# Patient Record
Sex: FEMALE | Race: WHITE | Employment: FULL TIME | URBAN - METROPOLITAN AREA
[De-identification: names, ages, dates, MRNs, and addresses within clinical notes are randomized per-mention and may not be internally consistent; named-entity substitution may affect disease eponyms.]

---

## 2017-01-15 ENCOUNTER — HOSPITAL ENCOUNTER (EMERGENCY)
Facility: CLINIC | Age: 32
Discharge: HOME | End: 2017-01-15
Attending: EMERGENCY MEDICINE | Admitting: EMERGENCY MEDICINE
Payer: COMMERCIAL

## 2017-01-15 ENCOUNTER — TELEPHONE (OUTPATIENT)
Dept: NURSING | Facility: CLINIC | Age: 32
End: 2017-01-15

## 2017-01-15 VITALS
HEART RATE: 78 BPM | SYSTOLIC BLOOD PRESSURE: 122 MMHG | BODY MASS INDEX: 25.39 KG/M2 | RESPIRATION RATE: 16 BRPM | WEIGHT: 155 LBS | TEMPERATURE: 99 F | OXYGEN SATURATION: 99 % | DIASTOLIC BLOOD PRESSURE: 87 MMHG

## 2017-01-15 DIAGNOSIS — N93.9 VAGINAL BLEEDING: ICD-10-CM

## 2017-01-15 LAB
ALBUMIN UR-MCNC: NEGATIVE MG/DL
APPEARANCE UR: CLEAR
BILIRUB UR QL STRIP: NEGATIVE
COLOR UR AUTO: YELLOW
GLUCOSE UR STRIP-MCNC: NEGATIVE MG/DL
HCG UR QL: NEGATIVE
HGB UR QL STRIP: ABNORMAL
KETONES UR STRIP-MCNC: NEGATIVE MG/DL
LEUKOCYTE ESTERASE UR QL STRIP: NEGATIVE
MUCOUS THREADS #/AREA URNS LPF: PRESENT /LPF
NITRATE UR QL: NEGATIVE
PH UR STRIP: 6.5 PH (ref 5–7)
RBC #/AREA URNS AUTO: 6 /HPF (ref 0–2)
SP GR UR STRIP: 1.01 (ref 1–1.03)
SQUAMOUS #/AREA URNS AUTO: <1 /HPF (ref 0–1)
URN SPEC COLLECT METH UR: ABNORMAL
UROBILINOGEN UR STRIP-MCNC: NORMAL MG/DL (ref 0–2)
WBC #/AREA URNS AUTO: <1 /HPF (ref 0–2)

## 2017-01-15 PROCEDURE — 81025 URINE PREGNANCY TEST: CPT | Performed by: EMERGENCY MEDICINE

## 2017-01-15 PROCEDURE — 99283 EMERGENCY DEPT VISIT LOW MDM: CPT

## 2017-01-15 PROCEDURE — 99284 EMERGENCY DEPT VISIT MOD MDM: CPT | Performed by: EMERGENCY MEDICINE

## 2017-01-15 PROCEDURE — 81001 URINALYSIS AUTO W/SCOPE: CPT | Performed by: EMERGENCY MEDICINE

## 2017-01-15 ASSESSMENT — ENCOUNTER SYMPTOMS
DIARRHEA: 0
ABDOMINAL PAIN: 1
VOMITING: 0

## 2017-01-15 NOTE — ED AVS SNAPSHOT
[facility] Emergency Department    [address] [facility] [address]    Phone:  [phone]    Fax:  [phone]                                       Roselyn Butler   MRN: [id number]    Department:  [facility] Emergency Department   Date of Visit:  1/15/2017           Patient Information     Date Of Birth          1985        Your diagnoses for this visit were:     Vaginal bleeding possible miscarriage       You were seen by Westley Chan MD.      Follow-up Information     Follow up with Agbeh, Cephas Mawuena, MD.    Specialty:  OB/Gyn    Why:  As needed    Contact information:    [facility]  [address]  [place] [address] [phone]          Follow up with [facility].    Specialty:  EMERGENCY MEDICINE    Why:  If symptoms worsen    Contact information:    [address] [place] [id number] [phone]    Additional information:    The medical center is located at   [address] [place] [address] (between [facility] and   [facility] in [place], [place] miles north   of [place]).        Discharge Instructions         Return is symptoms worsen or new symptoms develop.  Follow up with primary care physician next available.  Follow-up with your OB/Gyn next available.  Take ibuprofen or Tylenol for pain.  If increased bleeding, abdominal pain or other symptoms present please return for further evaluation and care.  Urine pregnancy test today was negative.  He more than likely has had a miscarriage or-year-old pregnancy tests were false positives.    Future Appointments        Provider Department Dept Phone Center    2/16/2017 1:00 PM Nusrat Wilson MD [facility] [phone] [place]      24 Hour Appointment Hotline       To make an appointment at any [facility], call [phone]). If you don't have a family doctor or clinic, we will help you find one. [facility] are conveniently  located to serve the needs of you and your family.             Review of your medicines      Our records show that you are taking the medicines listed below. If these are incorrect, please call your family doctor or clinic.        Dose / Directions Last dose taken    adapalene 0.1 % cream   Commonly known as:  DIFFERIN   Quantity:  45 g        Apply a pea sized amount to the face nightly.   Refills:  11        diclofenac 75 MG EC tablet   Commonly known as:  VOLTAREN   Dose:  75 mg   Quantity:  30 tablet        Take 1 tablet (75 mg) by mouth 2 times daily as needed for moderate pain   Refills:  1        methocarbamol 500 MG tablet   Commonly known as:  ROBAXIN   Dose:  500 mg   Quantity:  30 tablet        Take 1 tablet (500 mg) by mouth 3 times daily as needed for muscle spasms   Refills:  1                Procedures and tests performed during your visit     HCG qualitative urine    UA reflex to Microscopic      Orders Needing Specimen Collection     None      Pending Results     No orders found from 1/14/2017 to 1/16/2017.            Pending Culture Results     No orders found from 1/14/2017 to 1/16/2017.       Test Results from your hospital stay           1/15/2017  3:32 PM - Interface, Mobile Ads Results      Component Results     Component Value Ref Range & Units Status    Color Urine Yellow  Final    Appearance Urine Clear  Final    Glucose Urine Negative NEG mg/dL Final    Bilirubin Urine Negative NEG Final    Ketones Urine Negative NEG mg/dL Final    Specific Gravity Urine 1.015 1.003 - 1.035 Final    Blood Urine Trace (A) NEG Final    pH Urine 6.5 5.0 - 7.0 pH Final    Protein Albumin Urine Negative NEG mg/dL Final    Urobilinogen mg/dL Normal 0.0 - 2.0 mg/dL Final    Nitrite Urine Negative NEG Final    Leukocyte Esterase Urine Negative NEG Final    Source Midstream Urine  Final    RBC Urine 6 (H) 0 - 2 /HPF Final    WBC Urine <1 0 - 2 /HPF Final    Squamous Epithelial /HPF Urine <1 0 - 1 /HPF Final    Mucous  Urine Present (A) NEG /LPF Final         1/15/2017  3:31 PM - Interface, Flexilab Results      Component Results     Component Value Ref Range & Units Status    HCG Qual Urine Negative NEG Final                Thank you for choosing [facility]       Thank you for choosing [facility] for your care. Our goal is always to provide you with excellent care. Hearing back from our patients is one way we can continue to improve our services. Please take a few minutes to complete the written survey that you may receive in the mail after you visit with us. Thank you!        [employer] Information     [employer] gives you secure access to your electronic health record. If you see a primary care provider, you can also send messages to your care team and make appointments. If you have questions, please call your primary care clinic.  If you do not have a primary care provider, please call [phone] and they will assist you.        Care EveryWhere ID     This is your Care EveryWhere ID. This could be used by other organizations to access your [facility] medical records  [id number]        After Visit Summary       This is your record. Keep this with you and show to your community pharmacist(s) and doctor(s) at your next visit.

## 2017-01-15 NOTE — TELEPHONE ENCOUNTER
"Call Type: Triage Call    Presenting Problem: 'I took a home pregnancy test last week end and  it was positive, but all week end I have had bleeding like a normal  period(soaking through a pad about every two hrs.) some mild  cramping. I think I am about 4 weeks along.\" Denies other sx.  tTiaged and advised ER.  Triage Note:  Guideline Title: Pregnancy: Spontaneous Termination, Less Than 20 Weeks  Recommended Disposition: See ED Immediately  Original Inclination: Wanted to speak with a nurse  Override Disposition:  Intended Action: Follow advice given  Physician Contacted: No  Moderate vaginal bleeding ?  YES  Vaginal bleeding AND greater than 20 weeks gestation ? NO  Experiencing contractions AND 20-37 weeks gestation ? NO  Abdominal pain AND greater than 20 weeks gestation ? NO  New or worsening signs and symptoms that may indicate shock ? NO  More than 37 weeks gestation AND contractions ? NO  Recent positive pregnancy test or menses late AND new onset of pain on top or back  of shoulders ? NO  Unbearable abdominal, pelvic or back pain ? NO  Heavy vaginal bleeding (soaking 1 pad every hour for 2 hours or more) ? NO  Continuous bright red vaginal bleeding for more than 15 minutes (more than  spotting) ? NO  Persistent dizziness OR lightheadedness that does not resolve within ten minutes ?  NO  Physician Instructions:  Care Advice: Another adult should drive.  CALL  if either of these occur: increased bright red vaginal  bleeding or continuous (without relaxation) abdominal pain.  IMMEDIATE ACTION  "

## 2017-01-15 NOTE — ED NOTES
Pt stated bleeding started Friday 1/13/17- similar to period-type bleeding. Mild cramping. Denies nausea.

## 2017-01-15 NOTE — ED PROVIDER NOTES
History     Chief Complaint   Patient presents with     Vaginal Bleeding      LMP Dec 13th with + HPT 7 days ago now with vaginal bleeding since Friday.      HPI  Roselyn Butler is a 31 year old female who has a history of miscarriage and vaginal delivery who presents to the ED for evaluation of vaginal bleeding. Patient just found out that she was pregnant one week ago, and then started to experience vaginal bleeding similar to a menstrual cycle 3 days ago. She does have some abdominal pain that is similar to cramping. Patient has had one miscarriage at 10 weeks in between her two daughters who were vaginally delivered. She denies nausea or vomiting. She denies any significant pain at this time.      Patient Active Problem List   Diagnosis     Encounter for supervision of other normal pregnancy     Need for Tdap vaccination     Current Outpatient Prescriptions   Medication Sig Dispense Refill     methocarbamol (ROBAXIN) 500 MG tablet Take 1 tablet (500 mg) by mouth 3 times daily as needed for muscle spasms 30 tablet 1     diclofenac (VOLTAREN) 75 MG EC tablet Take 1 tablet (75 mg) by mouth 2 times daily as needed for moderate pain 30 tablet 1     adapalene (DIFFERIN) 0.1 % cream Apply a pea sized amount to the face nightly. 45 g 11     No Known Allergies    I have reviewed the Medications, Allergies, Past Medical and Surgical History, and Social History in the Epic system.    Review of Systems   Constitutional: Negative for fever and chills.   HENT: Negative for congestion.    Respiratory: Negative for chest tightness and shortness of breath.    Gastrointestinal: Positive for abdominal pain. Negative for vomiting, diarrhea and blood in stool.   Genitourinary: Positive for vaginal bleeding. Negative for dysuria, vaginal discharge, vaginal pain and pelvic pain.   Musculoskeletal: Negative for back pain.   Skin: Negative for rash.   Neurological: Negative for weakness, light-headedness, numbness and  headaches.   Hematological: Does not bruise/bleed easily.       Physical Exam   BP: (!) 133/108 mmHg  Pulse: 80  Temp: 99  F (37.2  C)  Resp: 16  Weight: 70.308 kg (155 lb)  SpO2: 99 %  Physical Exam   Constitutional: She appears well-developed and well-nourished. No distress.   Psychiatric: She has a normal mood and affect.   Nursing note and vitals reviewed.    HENT: Oral mucosa moist. No lesions.  Neck: Supple  Pulmonary/Chest: Lungs are clear to auscultation bilaterally.  Cardiovascular: Heart is regular rate and rhythm. No murmur.  Abdomen: Soft, non-distended, non-tender.   Musculoskeletal: Moving all extremities well. No peripheral edema.   Neurological: Alert. No focal neurologic deficit.   Skin: No rash.    ED Course   Procedures             Critical Care time:  none               Labs Ordered and Resulted from Time of ED Arrival Up to the Time of Departure from the ED   URINE MACROSCOPIC WITH REFLEX TO MICRO - Abnormal; Notable for the following:     Blood Urine Trace (*)     RBC Urine 6 (*)     Mucous Urine Present (*)     All other components within normal limits   HCG QUALITATIVE URINE             2:34 PM Patient assessed.   Assessments & Plan (with Medical Decision Making)UA and preg were obtained. No evidence of infection . Pregnancy test was negative.  I discussed  Findings with the patient The pregnancy test could have been wrong or she has had a miscarriage. I discussed getting an US but she did not think it was necessary at this time . She will return if symptoms worsen or new symptoms develop.     I have reviewed the nursing notes.    I have reviewed the findings, diagnosis, plan and need for follow up with the patient.    Discharge Medication List as of 1/15/2017  4:15 PM          Final diagnoses:   Vaginal bleeding - possible miscarriage   This document serves as a record of the services and decisions personally performed and made by Westley Chan MD. It was created on HIS/HER behalf by    Ashwini Collins, a trained medical scribe. The creation of this document is based the provider's statements to the medical scribe.  Ashwini Collins 2:34 PM 1/15/2017    Provider:   The information in this document, created by the medical scribe for me, accurately reflects the services I personally performed and the decisions made by me. I have reviewed and approved this document for accuracy prior to leaving the patient care area.  Westley Chan MD 2:34 PM 1/15/2017    1/15/2017   [facility] EMERGENCY DEPARTMENT      Westley Chan MD  01/18/17 2130

## 2017-01-15 NOTE — DISCHARGE INSTRUCTIONS
Return is symptoms worsen or new symptoms develop.  Follow up with primary care physician next available.  Follow-up with your OB/Gyn next available.  Take ibuprofen or Tylenol for pain.  If increased bleeding, abdominal pain or other symptoms present please return for further evaluation and care.  Urine pregnancy test today was negative.  He more than likely has had a miscarriage or-year-old pregnancy tests were false positives.

## 2017-01-15 NOTE — ED AVS SNAPSHOT
[facility] Emergency Department    [address] [facility] [address]    Phone:  [phone]    Fax:  [phone]                                       Roselyn Butler   MRN: [id number]    Department:  [facility] Emergency Department   Date of Visit:  1/15/2017           After Visit Summary Signature Page     I have received my discharge instructions, and my questions have been answered. I have discussed any challenges I see with this plan with the nurse or doctor.    ..........................................................................................................................................  Patient/Patient Representative Signature      ..........................................................................................................................................  Patient Representative Print Name and Relationship to Patient    ..................................................               ................................................  Date                                            Time    ..........................................................................................................................................  Reviewed by Signature/Title    ...................................................              ..............................................  Date                                                            Time

## 2017-01-16 ENCOUNTER — OFFICE VISIT (OUTPATIENT)
Dept: FAMILY MEDICINE | Facility: CLINIC | Age: 32
End: 2017-01-16
Payer: COMMERCIAL

## 2017-01-16 VITALS
HEIGHT: 66 IN | DIASTOLIC BLOOD PRESSURE: 80 MMHG | HEART RATE: 79 BPM | BODY MASS INDEX: 25.23 KG/M2 | TEMPERATURE: 98 F | OXYGEN SATURATION: 99 % | WEIGHT: 157 LBS | SYSTOLIC BLOOD PRESSURE: 132 MMHG

## 2017-01-16 DIAGNOSIS — O03.9 SPONTANEOUS ABORTION: Primary | ICD-10-CM

## 2017-01-16 PROCEDURE — 36415 COLL VENOUS BLD VENIPUNCTURE: CPT | Performed by: PHYSICIAN ASSISTANT

## 2017-01-16 PROCEDURE — 84702 CHORIONIC GONADOTROPIN TEST: CPT | Performed by: PHYSICIAN ASSISTANT

## 2017-01-16 PROCEDURE — 99212 OFFICE O/P EST SF 10 MIN: CPT | Performed by: PHYSICIAN ASSISTANT

## 2017-01-16 NOTE — PROGRESS NOTES
SUBJECTIVE:                                                    Roselyn Butler is a 31 year old female who presents to clinic today for the following health issues:      ED/UC Followup:    Facility:  [place]  Date of visit: 1/15/17  Reason for visit: miscarriage  Current Status:            Problem list and histories reviewed & adjusted, as indicated.  Additional history: as documented    Problem list, Medication list, Allergies, and Medical/Social/Surgical histories reviewed in [facility] and updated as appropriate.    Patient Active Problem List   Diagnosis     Encounter for supervision of other normal pregnancy     Need for Tdap vaccination     Social History   Substance Use Topics     Smoking status: Never Smoker      Smokeless tobacco: Never Used      Comment: non smoking household     Alcohol Use: No     All other systems negative except as outline above  OBJECTIVE:  Eye exam - right eye normal lid, conjunctiva, cornea, pupil and fundus, left eye normal lid, conjunctiva, cornea, pupil and fundus.  Thyroid not palpable, not enlarged, no nodules detected.  CHEST:chest clear to IPPA, no tachypnea, retractions or cyanosis and S1, S2 normal, no murmur, no gallop, rate regular.  The abdomen is soft without tenderness, guarding, mass, rebound or organomegaly. Bowel sounds are normal. No CVA tenderness or inguinal adenopathy noted.    Roselyn was seen today for miscarriage.    Diagnoses and all orders for this visit:    Spontaneous   -     HCG, quantitative, pregnancy

## 2017-01-16 NOTE — MR AVS SNAPSHOT
After Visit Summary   2017    Roselyn Butler    MRN: [id number]           Patient Information     Date Of Birth          1985        Visit Information        Provider Department      2017 4:20 PM Bart Fritz PA-C [facility] [place]        Today's Diagnoses     Spontaneous     -  1        Follow-ups after your visit        Your next 10 appointments already scheduled     2017  1:00 PM   Return Visit with Nusrat Wilson MD   [facility]    [address] [place] [address] [phone]              Who to contact     Normal or non-critical lab and imaging results will be communicated to you by MyChart, letter or phone within 4 business days after the clinic has received the results. If you do not hear from us within 7 days, please contact the clinic through MyChart or phone. If you have a critical or abnormal lab result, we will notify you by phone as soon as possible.  Submit refill requests through [employer] or call your pharmacy and they will forward the refill request to us. Please allow 3 business days for your refill to be completed.          If you need to speak with a  for additional information , please call: [phone]             Additional Information About Your Visit        [employer] Information     [employer] gives you secure access to your electronic health record. If you see a primary care provider, you can also send messages to your care team and make appointments. If you have questions, please call your primary care clinic.  If you do not have a primary care provider, please call [phone] and they will assist you.        Care EveryWhere ID     This is your Care EveryWhere ID. This could be used by other organizations to access your [facility] medical records  [id number]        Your Vitals Were     Pulse Temperature Height BMI (Body Mass Index) Pulse Oximetry Last  "Period    79 98  F (36.7  C) (Tympanic) 5' 5.5\" (1.664 m) 25.72 kg/m2 99% 01/15/2017       Blood Pressure from Last 3 Encounters:   01/16/17 132/80   01/15/17 122/87   10/27/16 116/72    Weight from Last 3 Encounters:   01/16/17 157 lb (71.215 kg)   01/15/17 155 lb (70.308 kg)   10/27/16 160 lb (72.576 kg)              We Performed the Following     HCG, quantitative, pregnancy          Today's Medication Changes          These changes are accurate as of: 1/16/17  4:51 PM.  If you have any questions, ask your nurse or doctor.               Stop taking these medicines if you haven't already. Please contact your care team if you have questions.     diclofenac 75 MG EC tablet   Commonly known as:  VOLTAREN   Stopped by:  Bart Fritz PA-C           methocarbamol 500 MG tablet   Commonly known as:  ROBAXIN   Stopped by:  Bart Fritz PA-C                    Primary Care Provider Office Phone # Fax #    Gkdoil Mawuena Agbeh, [phone]       [facility] [address] [facility] [address]        Thank you!     Thank you for choosing [facility]  for your care. Our goal is always to provide you with excellent care. Hearing back from our patients is one way we can continue to improve our services. Please take a few minutes to complete the written survey that you may receive in the mail after your visit with us. Thank you!             Your Updated Medication List - Protect others around you: Learn how to safely use, store and throw away your medicines at www.disposemymeds.org.          This list is accurate as of: 1/16/17  4:51 PM.  Always use your most recent med list.                   Brand Name Dispense Instructions for use    adapalene 0.1 % cream    DIFFERIN    45 g    Apply a pea sized amount to the face nightly.         "

## 2017-01-17 LAB — B-HCG SERPL-ACNC: 21 IU/L

## 2017-01-18 ASSESSMENT — ENCOUNTER SYMPTOMS
HEADACHES: 0
LIGHT-HEADEDNESS: 0
FEVER: 0
BRUISES/BLEEDS EASILY: 0
SHORTNESS OF BREATH: 0
CHEST TIGHTNESS: 0
DYSURIA: 0
CHILLS: 0
BACK PAIN: 0
BLOOD IN STOOL: 0
WEAKNESS: 0
NUMBNESS: 0

## 2017-01-25 DIAGNOSIS — O03.9 SPONTANEOUS ABORTION: ICD-10-CM

## 2017-01-25 LAB — B-HCG SERPL-ACNC: <1 IU/L

## 2017-01-25 PROCEDURE — 36415 COLL VENOUS BLD VENIPUNCTURE: CPT | Performed by: PHYSICIAN ASSISTANT

## 2017-01-25 PROCEDURE — 84702 CHORIONIC GONADOTROPIN TEST: CPT | Performed by: PHYSICIAN ASSISTANT

## 2017-02-16 ENCOUNTER — OFFICE VISIT (OUTPATIENT)
Dept: DERMATOLOGY | Facility: CLINIC | Age: 32
End: 2017-02-16
Payer: COMMERCIAL

## 2017-02-16 DIAGNOSIS — D23.9 DYSPLASTIC NEVUS: ICD-10-CM

## 2017-02-16 DIAGNOSIS — L90.5 SCAR: Primary | ICD-10-CM

## 2017-02-16 PROCEDURE — 99213 OFFICE O/P EST LOW 20 MIN: CPT | Performed by: DERMATOLOGY

## 2017-02-16 NOTE — PROGRESS NOTES
[facility] Dermatology Note      Dermatology Problem List:  1. Compound nevus with moderate dysplasia, left lower back  -s/p biopsy 10/1/2016  2. Comedones, nose  -Previous Tx: Differin (initiated 10/17/2016)    Encounter Date: Feb 16, 2017    CC:  Chief Complaint   Patient presents with     RECHECK     4 month skin check - Hx of dysplastic nevus         History of Present Illness:  Ms. Roselyn Butler is a 31 year old female who presents as a follow up for history of dysplastic nevus. The patient was last seen 10/17/2016 when a pink macule on the right temple was identified for clinical monitoring, Differin started for comedones on the nose and a biopsy performed on the right upper back and left lower back. Today, the patient reports that she stopped Differin because she is trying for children. The patient reports no other lesions of concern.      Past Medical History:   Patient Active Problem List   Diagnosis     Encounter for supervision of other normal pregnancy     Need for Tdap vaccination     Past Medical History   Diagnosis Date     NO ACTIVE PROBLEMS      Past Surgical History   Procedure Laterality Date     Dental surgery       wisdom teeth      Social History:  The patient works as a .     Family History:  No family history of melanoma    Medications:  Current Outpatient Prescriptions   Medication Sig Dispense Refill     adapalene (DIFFERIN) 0.1 % cream Apply a pea sized amount to the face nightly. 45 g 11     No Known Allergies    Review of Systems:  -OB: Is currently trying for children.   -Skin: As above in HPI. No additional skin concerns.    Physical exam:  Vitals: There were no vitals taken for this visit.  GEN: This is a well developed, well-nourished female in no acute distress, in a pleasant mood.    SKIN: Focused examination of the right upper back and left lower back was performed.  -There is a well healed surgical scar without erythema, nodularity or  telangiectasias on the right temple.   -Scar with repigmentation on the left lower back.  -No other lesions of concern on areas examined.       Impression/Plan:  1. History of dysplastic nevus    Last total skin exam 10/17/2016  2. Pink macule, consistent with scar, right temple-stable    No further intervention required at this time.   3. Comedones, nose. Improved on Differin    Hold Differin 0.1% cream.   4. Compound nevus with moderate dysplasia, left lower back, s/p biopsy 10/17/2016. Recurrent. Per pathology if recurs should surgically remove.     Plan to schedule for excision with Dr. Alvarenga    Follow up in 1 year for excision, earlier for excision with Dr. Alvarenga, earlier for new or changing lesions.     Staff Involved:  Scribe/Staff    Scribe Disclosure:   I, Mary Cruz, am serving as a scribe to document services personally performed by Dr. Nusrat Wilson, based on data collection and the provider's statements to me.     Provider Disclosure:   I agree with above History, Review of Systems, Physical exam and Plan. I have reviewed the content of the documentation and have edited it as needed. I have personally performed the services documented here and the documentation accurately represents those services and the decisions I have made.     Nusrat Wilson MD    Department of Dermatology  [facility]: Phone: [phone], Fax:[phone]  [facility]: Phone: [phone], Fax: [phone]

## 2017-02-16 NOTE — MR AVS SNAPSHOT
After Visit Summary   2/16/2017    Roselyn Butler    MRN: [id number]           Patient Information     Date Of Birth          1985        Visit Information        Provider Department      2/16/2017 1:00 PM Nusrat Wilson MD [facility], [place]        Today's Diagnoses     Scar    -  1    Dysplastic nevus           Follow-ups after your visit        Who to contact     If you have questions or need follow up information about today's clinic visit or your schedule please contact [facility] directly at [phone].  Normal or non-critical lab and imaging results will be communicated to you by GetNinjashart, letter or phone within 4 business days after the clinic has received the results. If you do not hear from us within 7 days, please contact the clinic through GetNinjashart or phone. If you have a critical or abnormal lab result, we will notify you by phone as soon as possible.  Submit refill requests through [facility] or call your pharmacy and they will forward the refill request to us. Please allow 3 business days for your refill to be completed.          Additional Information About Your Visit        MyChart Information     [facility] gives you secure access to your electronic health record. If you see a primary care provider, you can also send messages to your care team and make appointments. If you have questions, please call your primary care clinic.  If you do not have a primary care provider, please call [phone] and they will assist you.      [facility] is an electronic gateway that provides easy, online access to your medical records. With [facility], you can request a clinic appointment, read your test results, renew a prescription or communicate with your care team.     To access your existing account, please contact your [facility] or call [phone] for assistance.        Care EveryWhere ID     This is your Care EveryWhere ID. This could  be used by other organizations to access your [facility] medical records  [id number]         Blood Pressure from Last 3 Encounters:   01/16/17 132/80   01/15/17 122/87   10/27/16 116/72    Weight from Last 3 Encounters:   01/16/17 71.2 kg (157 lb)   01/15/17 70.3 kg (155 lb)   10/27/16 72.6 kg (160 lb)              Today, you had the following     No orders found for display       Primary Care Provider Office Phone # Fax #    Amber Mawuena Agbeh, [phone]       [facility] [address] [facility] [address]        Thank you!     Thank you for choosing [facility]  for your care. Our goal is always to provide you with excellent care. Hearing back from our patients is one way we can continue to improve our services. Please take a few minutes to complete the written survey that you may receive in the mail after your visit with us. Thank you!             Your Updated Medication List - Protect others around you: Learn how to safely use, store and throw away your medicines at www.disposemymeds.org.          This list is accurate as of: 2/16/17  1:35 PM.  Always use your most recent med list.                   Brand Name Dispense Instructions for use    adapalene 0.1 % cream    DIFFERIN    45 g    Apply a pea sized amount to the face nightly.

## 2017-02-16 NOTE — NURSING NOTE
Dermatology Rooming Note    Roselyn Butler's goals for this visit include:   Chief Complaint   Patient presents with     RECHECK     4 month skin check - Hx of dysplastic nevus       Is a scribe okay for this visit: YES    Are records needed for this visit(If yes, obtain release of information): NO     Vitals: There were no vitals taken for this visit.    Referring Provider:  No referring provider defined for this encounter.    Madelyn Alaniz, CMA

## 2017-03-30 ENCOUNTER — OFFICE VISIT (OUTPATIENT)
Dept: DERMATOLOGY | Facility: CLINIC | Age: 32
End: 2017-03-30
Payer: COMMERCIAL

## 2017-03-30 VITALS — OXYGEN SATURATION: 98 % | DIASTOLIC BLOOD PRESSURE: 75 MMHG | HEART RATE: 93 BPM | SYSTOLIC BLOOD PRESSURE: 125 MMHG

## 2017-03-30 DIAGNOSIS — D23.5 DYSPLASTIC NEVUS OF TRUNK: Primary | ICD-10-CM

## 2017-03-30 PROCEDURE — 12031 INTMD RPR S/A/T/EXT 2.5 CM/<: CPT | Performed by: DERMATOLOGY

## 2017-03-30 PROCEDURE — 11401 EXC TR-EXT B9+MARG 0.6-1 CM: CPT | Performed by: DERMATOLOGY

## 2017-03-30 PROCEDURE — 88305 TISSUE EXAM BY PATHOLOGIST: CPT | Performed by: DERMATOLOGY

## 2017-03-30 NOTE — LETTER
3/30/2017        RE: Roselyn RACHANA Butler  [address]  [place] [address]     Dear Colleague,    Thank you for referring your patient, Roselyn Butler, to the [facility]. Please see a copy of my visit note below.    DERMATOLOGY EXCISION PROCEDURE NOTE    Dermatology Problem List:  1. Compound nevus with moderate dysplasia, left lower back  -s/p biopsy 10/17/2016, treated today  2. Comedones, nose  -Previous Tx: Differin (initiated 10/17/2016)    NAME OF PROCEDURE: Excision intermediate layered linear closure  Staff surgeon: Sury Alvarenga MD  Resident: None  Scrub Nurse: Raine Del Valle LPN    PRE-OPERATIVE DIAGNOSIS:  Moderately dysplastic nevus  POST-OPERATIVE DIAGNOSIS: Same   FINAL EXCISION SIZE(DEFECT SIZE): 1.0 cm, with 2 mm margin   FINAL REPAIR LENGTH: 2.0 cm     INDICATIONS: This patient presented with a 5 x 6 mm tan scar with a central 1.5 mm dark pigment. Excision was indicated. We discussed the principles of treatment and most likely complications including scarring, bleeding, infection, incomplete excision, wound dehiscence, pain, nerve damage, and recurrence. Informed consent was obtained and the patient underwent the procedure as follows:    PROCEDURE: The patient was taken to the operative suite. Time-out was performed.  The treatment area was anesthetized with 1% lidocaine with epinephrine. The area was prepped with Chlorhexidine and rinsed with sterile saline and draped with sterile towels. The lesion was delineated and excised down to subcutaneous fat in a elliptical manner. Hemostasis was obtained by electrocoagulation.     REPAIR: An intermediate layered linear closure was selected as the procedure which would maximally preserve both function and cosmesis.    After the excision of the tumor, the area was carefully undermined. Hemostasis was obtained with electrocoagulation.  Closure was oriented so that the  wound was in the patient's natural skin tension lines. The subcutaneous and dermal layers were then closed with 3-0 vicryl sutures. The epidermis was then carefully approximated along the length of the wound using 4-0 monocryl running subcuticular sutures.     The final wound length was 2.0 cm. A total of 2.0 ml of anesthesia was administered for all surgical sites. Estimated blood loss was less than 10 ml for all surgical sites. A sterile pressure dressing was applied and wound care instructions, with a written handout, were given. The [facility] alert and ambulatory.    Dr. Sury Alvarenga MD was immediately available for the entire surgery and was physicially present for the key portions of the procedure.    Anatomic Pathology Results: pending    Clinical Follow-Up: annually    Staff Involved:  Scribe/Staff  I, Jamari Chung, am serving as a scribe to document services personally performed by Dr. Sury Alvarenga MD, based on data collection and the provider's statements to me.  Provider Disclosure:   I agree with above History, Review of Systems, Physical exam and Plan. I have reviewed the content of the documentation and have edited it as needed. I have personally performed the services documented here and the documentation accurately represents those services and the decisions I have made.     Sury Alvarenga MD    Department of Dermatology  [facility]

## 2017-03-30 NOTE — PATIENT INSTRUCTIONS
Excision Wound Care Instructions  I will experience scar, altered skin color, bleeding, swelling, pain, crusting and redness. I may experience altered sensation. Risks are excessive bleeding, infection, muscle weakness, thick (hypertrophic or keloidal) scar, and recurrence,. A second procedure may be recommended to obtain the best cosmetic or functional result.  Possible complications of any surgical procedure are bleeding, infection, scarring, alteration in skin color and sensation, muscle weakness in the area, wound dehiscence or seperation, or recurrence of the lesion or disease. On occasion, after healing, a secondary procedure or revision may be recommended in order to obtain the best cosmetic or functional result.   After your surgery, a pressure bandage will be placed over the area that has sutures. This will help prevent bleeding. Please follow these instructions, as they will help you to prevent complications as your wound heals.  For the First 48 hours After Surgery:  1. Leave the pressure bandage on and keep it dry. If it should come loose, you may retape it, but do not take it off.  2. Relax and take it easy. Do not do any vigorous exercise, heavy lifting, or bending forward. This could cause the wound to bleed.  3. Post-operative pain is usually mild. You may take plain or extra strength Tylenol every 4 hours as needed (do not take more than 4,000mg in one day). Do not take any medicine that contains aspirin, ibuprofen or motrin unless you have been recommended these by a doctor.  Avoid alcohol and vitamin E as these may increase your tendency to bleed.  4. You may put an ice pack around the bandaged area for 20 minutes every 2-3 hours. This may help reduce swelling, bruising, and pain. Make sure the ice pack is waterproof so that the pressure bandage does not get wet.   5. You may see a small amount of drainage or blood on your pressure bandage. This is normal. However, if drainage or bleeding  continues or saturates the bandage, you will need to apply firm pressure over the bandage with a washcloth for 15 minutes. If bleeding continues after applying pressure for 15 minutes then go to the nearest emergency room.  48 Hours After Surgery  Carefully remove the bandage and start daily wound care and dressing changes. You may also now shower and get the wound wet. Wash wound with a mild soap and water.  Use caution when washing the wound. Be gentle and do not let the forceful shower stream hit the wound directly.  PAT dry.  Daily Wound Care:  1. Wash wound with a mild soap and water.  Use caution when washing the wound, be gentle and do not let the forceful shower stream hit the wound directly.  2. PAT DRY.  3. Steri- Strips should stay on for 5-7days.  When they have fallen off then start to apply Vaseline (from a new container or tube) over the suture line with a Q-tip. It is very important to keep the wound continuously moist, as wounds heal best in a moist environment.  4.  Keep the site covered until healed, you can cover it with a Telfa (non-stick) dressing and tape or a band-aid.     Call Us If:  1. You have pain that is not controlled with Tylenol.  2. You have signs or symptoms of an infection, such as: fever over 100 degrees F, redness, warmth, or foul-smelling or yellow/creamy drainage from the wound.  Who should I call with questions?    [facility]: [phone]     [facility]: [phone]    For urgent needs outside of business hours call the [facility] at [phone] and ask for the dermatology resident on call

## 2017-03-30 NOTE — NURSING NOTE
Steri-Strips and pressure dressing applied to excison site on left lower back.  Wound care instructions reviewed with patient and AVS provided.  Patient verbalized understanding.  No further questions or concerns at this time.    Raine Del Valle LPN

## 2017-03-30 NOTE — PROGRESS NOTES
DERMATOLOGY EXCISION PROCEDURE NOTE    Dermatology Problem List:  1. Compound nevus with moderate dysplasia, left lower back  -s/p biopsy 10/17/2016, treated today  2. Comedones, nose  -Previous Tx: Differin (initiated 10/17/2016)    NAME OF PROCEDURE: Excision intermediate layered linear closure  Staff surgeon: Sury Alvarenga MD  Resident: None  Scrub Nurse: Raine Del Valle LPN    PRE-OPERATIVE DIAGNOSIS:  Moderately dysplastic nevus  POST-OPERATIVE DIAGNOSIS: Same   FINAL EXCISION SIZE(DEFECT SIZE): 1.0 cm, with 2 mm margin   FINAL REPAIR LENGTH: 2.0 cm     INDICATIONS: This patient presented with a 5 x 6 mm tan scar with a central 1.5 mm dark pigment. Excision was indicated. We discussed the principles of treatment and most likely complications including scarring, bleeding, infection, incomplete excision, wound dehiscence, pain, nerve damage, and recurrence. Informed consent was obtained and the patient underwent the procedure as follows:    PROCEDURE: The patient was taken to the operative suite. Time-out was performed.  The treatment area was anesthetized with 1% lidocaine with epinephrine. The area was prepped with Chlorhexidine and rinsed with sterile saline and draped with sterile towels. The lesion was delineated and excised down to subcutaneous fat in a elliptical manner. Hemostasis was obtained by electrocoagulation.     REPAIR: An intermediate layered linear closure was selected as the procedure which would maximally preserve both function and cosmesis.    After the excision of the tumor, the area was carefully undermined. Hemostasis was obtained with electrocoagulation.  Closure was oriented so that the wound was in the patient's natural skin tension lines. The subcutaneous and dermal layers were then closed with 3-0 vicryl sutures. The epidermis was then carefully approximated along the length of the wound using 4-0 monocryl running subcuticular sutures.     The final wound length was 2.0 cm. A total  of 2.0 ml of anesthesia was administered for all surgical sites. Estimated blood loss was less than 10 ml for all surgical sites. A sterile pressure dressing was applied and wound care instructions, with a written handout, were given. The patient was discharged from the [facility] alert and ambulatory.    Dr. Sury Alvarenga MD was immediately available for the entire surgery and was physicially present for the key portions of the procedure.    Anatomic Pathology Results: pending    Clinical Follow-Up: annually    Staff Involved:  Scribe/Staff  I, Jamari Chung, am serving as a scribe to document services personally performed by Dr. Sury Alvarenga MD, based on data collection and the provider's statements to me.  Provider Disclosure:   I agree with above History, Review of Systems, Physical exam and Plan. I have reviewed the content of the documentation and have edited it as needed. I have personally performed the services documented here and the documentation accurately represents those services and the decisions I have made.     Sury Alvarenga MD    Department of Dermatology  [facility]

## 2017-03-30 NOTE — NURSING NOTE
Dermatology Rooming Note    Roselyn Butler's goals for this visit include:   Chief Complaint   Patient presents with     Procedure     excision left lower back       Is a scribe okay for this visit:YES,     Are records needed for this visit(If yes, obtain release of information): No,      Vitals: /75 (BP Location: Left arm, Patient Position: Chair, Cuff Size: Adult Regular)  Pulse 93  SpO2 98%    Referring Provider:  No referring provider defined for this encounter.    Excision Intake  /75 (BP Location: Left arm, Patient Position: Chair, Cuff Size: Adult Regular)  Pulse 93  SpO2 98%    artificial heart valve: No    artificial joint within the last 3 years: No    heart stent in the last 3 months: No    pacemaker: No    defibrillator: No    nerve/brain stimulator: No    bleeding disorder: No    coumadin use: No    heart valve disease: No    site location lower limb or groin: No    hepatitis B/C or HIV: No    smoker: No    Iodine allergy: N/A     Raine Del Valle LPN

## 2017-03-30 NOTE — LETTER
"    Patient:  Claire Hall  :   1985  MRN:     [id number]        Ms.Jacquelyn RACHANA Hall  [address]  [place] [address]        2017    Dear ,    We are writing to inform you of your test results that show your mole was removed completely with clear margins. If you have further questions or concerns, please contact the clinic at [phone].      Sincerely,    Artis Alvarenga MD    Dermatology Department of Dermatology  [facility] [place]    Resulted Orders   Surgical pathology exam   Result Value Ref Range    Copath Report       Patient Name: CLAIRE HALL  MR#: [id number]  Specimen #: [id number]  Collected: 3/30/2017  Received: 3/31/2017  Reported: 4/3/2017 15:41  Ordering Phy(s): ARTIS ALVARENGA    For improved result formatting, select 'View Enhanced Report Format'  under Linked Documents section.    SPECIMEN(S):  Skin, left lower back    FINAL DIAGNOSIS:  Skin, back, left lower:  - Features consistent with recurrent nevus, completely excised - (see  description)    I have personally reviewed all specimens and or slides, including the  listed special stains, and used them with my medical judgement to  determine the final diagnosis.    Electronically signed out by:    Randy Mcintosh M.D., [facility]    CLINICAL HISTORY:  The patient is a 32-year-old female.    GROSS:  The specimen is received in formalin with a proper patient's  identification, labeled \"skin, left lower back\" and consists of a 1.4 x  0.7 x 0.7 cm unoriented ellipse of skin tissue fragment.  The resection  margins are inked in  black.  The specimen is serially sectioned and  entirely submitted in two cassettes (cassette1 -tips). (Dictated by:  Rodolfo Vickers 3/31/2017 11:44 AM)    MICROSCOPIC:  The specimen exhibits a junctional melanocytic proliferation which is  predominantly nested but occasionally single celled, with " moderate  cytologic atypia and some architectural disorder, but a relatively  narrow lateral diameter.  Given that this proliferation is entirely  confined above a zone of dermal fibrosis consistent with scar from the  prior surgical procedure, it is consistent with the recurrent nevus  phenomenon.  The lesion is completely excised.    CPT Codes:  A: 63217-IY4.T, 86038-IB1.P    TESTING LAB LOCATION:  [facility], [address]   [address] [phone]    COLLECTION SITE:  Client: [facility]  Location: [facility] ()

## 2017-03-30 NOTE — MR AVS SNAPSHOT
After Visit Summary   3/30/2017    Roselyn Butler    MRN: [id number]           Patient Information     Date Of Birth          1985        Visit Information        Provider Department      3/30/2017 2:00 PM Sury Alvarenga MD [facility]        Today's Diagnoses     Dysplastic nevus of trunk    -  1      Care Instructions    Excision Wound Care Instructions  I will experience scar, altered skin color, bleeding, swelling, pain, crusting and redness. I may experience altered sensation. Risks are excessive bleeding, infection, muscle weakness, thick (hypertrophic or keloidal) scar, and recurrence,. A second procedure may be recommended to obtain the best cosmetic or functional result.  Possible complications of any surgical procedure are bleeding, infection, scarring, alteration in skin color and sensation, muscle weakness in the area, wound dehiscence or seperation, or recurrence of the lesion or disease. On occasion, after healing, a secondary procedure or revision may be recommended in order to obtain the best cosmetic or functional result.   After your surgery, a pressure bandage will be placed over the area that has sutures. This will help prevent bleeding. Please follow these instructions, as they will help you to prevent complications as your wound heals.  For the First 48 hours After Surgery:  1. Leave the pressure bandage on and keep it dry. If it should come loose, you may retape it, but do not take it off.  2. Relax and take it easy. Do not do any vigorous exercise, heavy lifting, or bending forward. This could cause the wound to bleed.  3. Post-operative pain is usually mild. You may take plain or extra strength Tylenol every 4 hours as needed (do not take more than 4,000mg in one day). Do not take any medicine that contains aspirin, ibuprofen or motrin unless you have been recommended these by a doctor.  Avoid alcohol and vitamin E as these may increase your  tendency to bleed.  4. You may put an ice pack around the bandaged area for 20 minutes every 2-3 hours. This may help reduce swelling, bruising, and pain. Make sure the ice pack is waterproof so that the pressure bandage does not get wet.   5. You may see a small amount of drainage or blood on your pressure bandage. This is normal. However, if drainage or bleeding continues or saturates the bandage, you will need to apply firm pressure over the bandage with a washcloth for 15 minutes. If bleeding continues after applying pressure for 15 minutes then go to the nearest emergency room.  48 Hours After Surgery  Carefully remove the bandage and start daily wound care and dressing changes. You may also now shower and get the wound wet. Wash wound with a mild soap and water.  Use caution when washing the wound. Be gentle and do not let the forceful shower stream hit the wound directly.  PAT dry.  Daily Wound Care:  1. Wash wound with a mild soap and water.  Use caution when washing the wound, be gentle and do not let the forceful shower stream hit the wound directly.  2. PAT DRY.  3. Steri- Strips should stay on for 5-7days.  When they have fallen off then start to apply Vaseline (from a new container or tube) over the suture line with a Q-tip. It is very important to keep the wound continuously moist, as wounds heal best in a moist environment.  4.  Keep the site covered until healed, you can cover it with a Telfa (non-stick) dressing and tape or a band-aid.     Call Us If:  1. You have pain that is not controlled with Tylenol.  2. You have signs or symptoms of an infection, such as: fever over 100 degrees F, redness, warmth, or foul-smelling or yellow/creamy drainage from the wound.  Who should I call with questions?    [facility]: [phone]     [facility]: [phone]    For urgent needs outside of business hours call the [facility] at  [phone] and ask for the dermatology resident on call            Follow-ups after your visit        Your next 10 appointments already scheduled     Feb 16, 2018  9:45 AM CST   Return Visit with Nusrat Wilson MD   [facility] ([facility])    [address] [place] [address] [phone]              Who to contact     If you have questions or need follow up information about today's clinic visit or your schedule please contact [facility] directly at [phone].  Normal or non-critical lab and imaging results will be communicated to you by Stumpwisehart, letter or phone within 4 business days after the clinic has received the results. If you do not hear from us within 7 days, please contact the clinic through Stumpwisehart or phone. If you have a critical or abnormal lab result, we will notify you by phone as soon as possible.  Submit refill requests through Ivalua or call your pharmacy and they will forward the refill request to us. Please allow 3 business days for your refill to be completed.          Additional Information About Your Visit        MyChart Information     Ivalua gives you secure access to your electronic health record. If you see a primary care provider, you can also send messages to your care team and make appointments. If you have questions, please call your primary care clinic.  If you do not have a primary care provider, please call [phone] and they will assist you.      Ivalua is an electronic gateway that provides easy, online access to your medical records. With Ivalua, you can request a clinic appointment, read your test results, renew a prescription or communicate with your care team.     To access your existing account, please contact your [facility] or call [phone] for assistance.        Care EveryWhere ID     This is your Care EveryWhere ID. This could be used by other organizations  to access your [facility] medical records  [id number]        Your Vitals Were     Pulse Pulse Oximetry                93 98%           Blood Pressure from Last 3 Encounters:   03/30/17 125/75   01/16/17 132/80   01/15/17 122/87    Weight from Last 3 Encounters:   01/16/17 71.2 kg (157 lb)   01/15/17 70.3 kg (155 lb)   10/27/16 72.6 kg (160 lb)              Today, you had the following     No orders found for display       Primary Care Provider Office Phone # Fax #    Amber Mawuena Agbeh, [phone]       [facility] [address] [facility] [address]        Thank you!     Thank you for choosing [facility]  for your care. Our goal is always to provide you with excellent care. Hearing back from our patients is one way we can continue to improve our services. Please take a few minutes to complete the written survey that you may receive in the mail after your visit with us. Thank you!             Your Updated Medication List - Protect others around you: Learn how to safely use, store and throw away your medicines at www.disposemymeds.org.      Notice  As of 3/30/2017  2:50 PM    You have not been prescribed any medications.

## 2017-04-03 LAB — COPATH REPORT: NORMAL

## 2017-04-28 ENCOUNTER — OFFICE VISIT (OUTPATIENT)
Dept: OBGYN | Facility: CLINIC | Age: 32
End: 2017-04-28
Payer: COMMERCIAL

## 2017-04-28 VITALS
BODY MASS INDEX: 25.79 KG/M2 | TEMPERATURE: 98.9 F | WEIGHT: 157.4 LBS | SYSTOLIC BLOOD PRESSURE: 115 MMHG | OXYGEN SATURATION: 100 % | HEART RATE: 68 BPM | DIASTOLIC BLOOD PRESSURE: 74 MMHG

## 2017-04-28 DIAGNOSIS — Z32.00 PREGNANCY EXAMINATION OR TEST, PREGNANCY UNCONFIRMED: Primary | ICD-10-CM

## 2017-04-28 DIAGNOSIS — N96 RECURRENT PREGNANCY LOSS (CODE): ICD-10-CM

## 2017-04-28 LAB
B-HCG SERPL-ACNC: ABNORMAL IU/L (ref 0–5)
BETA HCG QUAL IFA URINE: POSITIVE
PROGEST SERPL-MCNC: 18 NG/ML

## 2017-04-28 PROCEDURE — 99214 OFFICE O/P EST MOD 30 MIN: CPT | Performed by: OBSTETRICS & GYNECOLOGY

## 2017-04-28 PROCEDURE — 84703 CHORIONIC GONADOTROPIN ASSAY: CPT | Performed by: OBSTETRICS & GYNECOLOGY

## 2017-04-28 PROCEDURE — 84702 CHORIONIC GONADOTROPIN TEST: CPT | Performed by: OBSTETRICS & GYNECOLOGY

## 2017-04-28 PROCEDURE — 84144 ASSAY OF PROGESTERONE: CPT | Performed by: OBSTETRICS & GYNECOLOGY

## 2017-04-28 PROCEDURE — 36415 COLL VENOUS BLD VENIPUNCTURE: CPT | Performed by: OBSTETRICS & GYNECOLOGY

## 2017-04-28 RX ORDER — PRENATAL VIT/IRON FUM/FOLIC AC 27MG-0.8MG
1 TABLET ORAL DAILY
COMMUNITY

## 2017-04-28 NOTE — MR AVS SNAPSHOT
After Visit Summary   4/28/2017    Roselyn Butler    MRN: [id number]           Patient Information     Date Of Birth          1985        Visit Information        Provider Department      4/28/2017 3:45 PM Agbeh, Cephas Mawuena, MD [facility]        Today's Diagnoses     Pregnancy examination or test, pregnancy unconfirmed    -  1    Recurrent pregnancy loss (CODE)          Care Instructions                                                           If you have any questions regarding your visit, Please contact your care team.    Women s Health CLINIC HOURS TELEPHONE NUMBER   Cephas Agbeh, M.D.    Ann Maradiaga- RN    Gayle - RN    Jayda -         Monday-[facility]  8:00 am - 5 pm  Tuesday- [facility]  8:00am- 5 pm  Wednesday- Off  Thursday- [facility]  8:00 am- 5 pm  Friday-[place]  8:00 am 5 pm [facility]  [address] [place] [address]  [phone] ask for Women's Clinic    Imaging [phone]    [facility]  [address] [place] [address]  [phone]  Imaging [phone]     Urgent Care locations:    [facility] Saturday and Sunday   9 am - 5 pm    Monday-Friday   12 pm - 8 pm  Saturday and Sunday   9 am - 5 pm   [phone]       If you need a medication refill, please contact your pharmacy. Please allow 3 business days for your refill to be completed.  As always, Thank you for trusting us with your healthcare needs!               Follow-ups after your visit        Your next 10 appointments already scheduled     May 01, 2017  4:00 PM CDT   LAB with BE LAB   [facility] ([facility])    [address] [facility] [id number] [phone]           Patient must bring picture ID.  Patient should be prepared to give a urine specimen  Please do not eat 10-12 hours before your appointment if you are  coming in fasting for labs on lipids, cholesterol, or glucose (sugar).  Pregnant women should follow their Care Team instructions. Water with medications is okay. Do not drink coffee or other fluids.   If you have concerns about taking  your medications, please ask at office or if scheduling via [employer], send a message by clicking on Secure Messaging, Message Your Care Team.            May 01, 2017  4:45 PM CDT   US OB < 14 WEEKS SINGLE with BEUS1   [facility] [place] ([facility])    [address] [address] [phone]           Please bring a list of your medicines (including vitamins, minerals and over-the-counter drugs). Also, tell your doctor about any allergies you may have. Wear comfortable clothes and leave your valuables at home.  If you re less than 20 weeks drink four 8-ounce glasses of fluid an hour before your exam. If you need to empty your bladder before your exam, try to release only a little urine. Then, drink another glass of fluid.  You may have up to two family members in the exam room. If you bring a small child, an adult must be there to care for him or her.  Please call the Imaging Department at your exam site with any questions.            Feb 16, 2018  9:45 AM CST   Return Visit with Nusrat Wilson MD   [facility] ([facility]    [address] [place] [address] [phone]              Future tests that were ordered for you today     Open Standing Orders        Priority Remaining Interval Expires Ordered    Progesterone Routine 5/6 4/28/2018 4/28/2017    HCG quantitative pregnancy Routine 5/6 4/28/2018 4/28/2017          Open Future Orders        Priority Expected Expires Ordered    US OB < 14 Weeks Single Routine  4/28/2018 4/28/2017            Who to contact     If you have questions or need follow up information about today's clinic visit or your schedule please contact [facility]  [facility] directly at [phone].  Normal or non-critical lab and imaging results will be communicated to you by MyChart, letter or phone within 4 business days after the clinic has received the results. If you do not hear from us within 7 days, please contact the clinic through OneSource Virtualhart or phone. If you have a critical or abnormal lab result, we will notify you by phone as soon as possible.  Submit refill requests through [employer] or call your pharmacy and they will forward the refill request to us. Please allow 3 business days for your refill to be completed.          Additional Information About Your Visit        [employer] Information     [employer] gives you secure access to your electronic health record. If you see a primary care provider, you can also send messages to your care team and make appointments. If you have questions, please call your primary care clinic.  If you do not have a primary care provider, please call [phone] and they will assist you.        Care EveryWhere ID     This is your Care EveryWhere ID. This could be used by other organizations to access your [facility] medical records  [id number]        Your Vitals Were     Pulse Temperature Last Period Pulse Oximetry BMI (Body Mass Index)       68 98.9  F (37.2  C) (Tympanic) 03/12/2017 100% 25.79 kg/m2        Blood Pressure from Last 3 Encounters:   04/28/17 115/74   03/30/17 125/75   01/16/17 132/80    Weight from Last 3 Encounters:   04/28/17 157 lb 6.4 oz (71.4 kg)   01/16/17 157 lb (71.2 kg)   01/15/17 155 lb (70.3 kg)              We Performed the Following     Beta HCG qual IFA urine     HCG quantitative pregnancy     Progesterone        Primary Care Provider Office Phone # Fax #    Amber Mawuena Agbeh, [phone]       [facility] [address] [place] [address]        Thank you!     Thank you for choosing [facility]  for your care. Our goal is always to provide you with excellent care.  Hearing back from our patients is one way we can continue to improve our services. Please take a few minutes to complete the written survey that you may receive in the mail after your visit with us. Thank you!             Your Updated Medication List - Protect others around you: Learn how to safely use, store and throw away your medicines at www.disposemymeds.org.          This list is accurate as of: 4/28/17  4:27 PM.  Always use your most recent med list.                   Brand Name Dispense Instructions for use    prenatal multivitamin  plus iron 27-0.8 MG Tabs per tablet      Take 1 tablet by mouth daily

## 2017-04-28 NOTE — PATIENT INSTRUCTIONS
If you have any questions regarding your visit, Please contact your care team.    Women s Health CLINIC HOURS TELEPHONE NUMBER   Ambers Agbeh, M.D.    Ann Maradiaga- FENG Araujo - FENG Montelongo -         Monday-[facility]  8:00 am - 5 pm  Tuesday- [facility]  8:00am- 5 pm  Wednesday- Off  Thursday- [facility]  8:00 am- 5 pm  Friday-[place]  8:00 am 5 pm [facility]  [address] [place] [address]  [phone] ask for [facility]    Imaging [phone]    [facility]  [address] [facility]  [place] [address] [phone]  Imaging [phone]     Urgent Care locations:    [facility] Saturday and Sunday   9 am - 5 pm    Monday-Friday   12 pm - 8 pm  Saturday and Sunday   9 am - 5 pm   [phone]       If you need a medication refill, please contact your pharmacy. Please allow 3 business days for your refill to be completed.  As always, Thank you for trusting us with your healthcare needs!

## 2017-04-28 NOTE — PROGRESS NOTES
Roselyn is a 32 year old  referred here by self for consultation regarding pregnncy confirmation.  H/O early pregnancies. She is very concerned.  LMP of 3/12/17  EGA of about 6.5 weeks..    ROS: Ten point review of systems was reviewed and negative except the above.    Gyne: - abn pap (last pap ), - STD's    Past Medical History:   Diagnosis Date     NO ACTIVE PROBLEMS      Obstetric History       T2      TAB0   SAB2   E0   M0   L2       # Outcome Date GA Lbr Abisai/2nd Weight Sex Delivery Anes PTL Lv   4 SAB 17           3 Term 06/08/15   8 lb 1 oz (3.657 kg) F Vag-Spont   Y      Name: Sheryl   2 SAB 07/10/14           1 Term 12 39w4d  7 lb 10 oz (3.459 kg) F    Y      Name: Nikki          Past Surgical History:   Procedure Laterality Date     DENTAL SURGERY      wisdom teeth      Patient Active Problem List   Diagnosis     Encounter for supervision of other normal pregnancy     Need for Tdap vaccination       ALL/Meds: Her medication and allergy histories were reviewed and are documented in their appropriate chart areas.    SH: - tob, - EtOH,     FH: Her family history was reviewed and documented in its appropriate chart area.    PE: /74 (BP Location: Left arm, Patient Position: Chair, Cuff Size: Adult Regular)  Pulse 68  Temp 98.9  F (37.2  C) (Tympanic)  Wt 157 lb 6.4 oz (71.4 kg)  LMP 2017  SpO2 100%  BMI 25.79 kg/m2  Body mass index is 25.79 kg/(m^2).    General:  WNWD female, NAD  Alert  Oriented x 3  Gait:  Normal  Skin:  Normal skin turgor  HEENT:  NC/AT, EOMI  Abdomen:  Non-tender, non-distended.  Pelvic exam:  Not performed  Extremities:  No clubbing, no cyanosis and no edema.    Results for orders placed or performed in visit on 17   Beta HCG qual IFA urine   Result Value Ref Range    Beta HCG Qual IFA Urine Positive (A) NEG       A/P    ICD-10-CM    1. Pregnancy examination or test, pregnancy unconfirmed Z32.00 Prenatal Vit-Fe Fumarate-FA  (PRENATAL MULTIVITAMIN  PLUS IRON) 27-0.8 MG TABS per tablet     Beta HCG qual IFA urine     Progesterone     HCG quantitative pregnancy     US OB < 14 Weeks Single     Progesterone     HCG quantitative pregnancy   2. Recurrent pregnancy loss (CODE) N96 Prenatal Vit-Fe Fumarate-FA (PRENATAL MULTIVITAMIN  PLUS IRON) 27-0.8 MG TABS per tablet     Beta HCG qual IFA urine     Progesterone     HCG quantitative pregnancy     US OB < 14 Weeks Single     Progesterone     HCG quantitative pregnancy       Serial hcg and early U/S.  25 minutes was spent face to face with the patient today discussing her history, diagnosis, and follow-up plan as noted above.  Over 50% of the visit was spent in counseling and coordination of care.    Total Visit Time: 30 minutes.    CEPHAS AGBEH, MD.

## 2017-05-01 ENCOUNTER — RADIANT APPOINTMENT (OUTPATIENT)
Dept: ULTRASOUND IMAGING | Facility: CLINIC | Age: 32
End: 2017-05-01
Attending: OBSTETRICS & GYNECOLOGY
Payer: COMMERCIAL

## 2017-05-01 DIAGNOSIS — N96 RECURRENT PREGNANCY LOSS (CODE): ICD-10-CM

## 2017-05-01 DIAGNOSIS — Z32.00 PREGNANCY EXAMINATION OR TEST, PREGNANCY UNCONFIRMED: ICD-10-CM

## 2017-05-01 LAB
B-HCG SERPL-ACNC: ABNORMAL IU/L (ref 0–5)
PROGEST SERPL-MCNC: 19.2 NG/ML

## 2017-05-01 PROCEDURE — 36415 COLL VENOUS BLD VENIPUNCTURE: CPT | Performed by: OBSTETRICS & GYNECOLOGY

## 2017-05-01 PROCEDURE — 76801 OB US < 14 WKS SINGLE FETUS: CPT

## 2017-05-01 PROCEDURE — 84144 ASSAY OF PROGESTERONE: CPT | Performed by: OBSTETRICS & GYNECOLOGY

## 2017-05-01 PROCEDURE — 84702 CHORIONIC GONADOTROPIN TEST: CPT | Performed by: OBSTETRICS & GYNECOLOGY

## 2017-05-22 ENCOUNTER — PRENATAL OFFICE VISIT (OUTPATIENT)
Dept: OBGYN | Facility: CLINIC | Age: 32
End: 2017-05-22
Payer: COMMERCIAL

## 2017-05-22 VITALS
HEART RATE: 70 BPM | BODY MASS INDEX: 25.66 KG/M2 | OXYGEN SATURATION: 100 % | SYSTOLIC BLOOD PRESSURE: 114 MMHG | WEIGHT: 156.6 LBS | TEMPERATURE: 98.3 F | DIASTOLIC BLOOD PRESSURE: 72 MMHG

## 2017-05-22 DIAGNOSIS — Z34.80 ENCOUNTER FOR SUPERVISION OF OTHER NORMAL PREGNANCY: Primary | ICD-10-CM

## 2017-05-22 LAB
BASOPHILS # BLD AUTO: 0 10E9/L (ref 0–0.2)
BASOPHILS NFR BLD AUTO: 0.4 %
DIFFERENTIAL METHOD BLD: ABNORMAL
EOSINOPHIL # BLD AUTO: 0.1 10E9/L (ref 0–0.7)
EOSINOPHIL NFR BLD AUTO: 1.3 %
ERYTHROCYTE [DISTWIDTH] IN BLOOD BY AUTOMATED COUNT: 11.8 % (ref 10–15)
HCT VFR BLD AUTO: 34.6 % (ref 35–47)
HGB BLD-MCNC: 12.1 G/DL (ref 11.7–15.7)
LYMPHOCYTES # BLD AUTO: 2.4 10E9/L (ref 0.8–5.3)
LYMPHOCYTES NFR BLD AUTO: 42.3 %
MCH RBC QN AUTO: 32.5 PG (ref 26.5–33)
MCHC RBC AUTO-ENTMCNC: 35 G/DL (ref 31.5–36.5)
MCV RBC AUTO: 93 FL (ref 78–100)
MONOCYTES # BLD AUTO: 0.3 10E9/L (ref 0–1.3)
MONOCYTES NFR BLD AUTO: 5.8 %
NEUTROPHILS # BLD AUTO: 2.8 10E9/L (ref 1.6–8.3)
NEUTROPHILS NFR BLD AUTO: 50.2 %
PLATELET # BLD AUTO: 156 10E9/L (ref 150–450)
RBC # BLD AUTO: 3.72 10E12/L (ref 3.8–5.2)
WBC # BLD AUTO: 5.6 10E9/L (ref 4–11)

## 2017-05-22 PROCEDURE — 87340 HEPATITIS B SURFACE AG IA: CPT | Performed by: OBSTETRICS & GYNECOLOGY

## 2017-05-22 PROCEDURE — 87389 HIV-1 AG W/HIV-1&-2 AB AG IA: CPT | Performed by: OBSTETRICS & GYNECOLOGY

## 2017-05-22 PROCEDURE — 99207 ZZC FIRST OB VISIT: CPT | Performed by: OBSTETRICS & GYNECOLOGY

## 2017-05-22 PROCEDURE — 87591 N.GONORRHOEAE DNA AMP PROB: CPT | Performed by: OBSTETRICS & GYNECOLOGY

## 2017-05-22 PROCEDURE — 85025 COMPLETE CBC W/AUTO DIFF WBC: CPT | Performed by: OBSTETRICS & GYNECOLOGY

## 2017-05-22 PROCEDURE — 36415 COLL VENOUS BLD VENIPUNCTURE: CPT | Performed by: OBSTETRICS & GYNECOLOGY

## 2017-05-22 PROCEDURE — 86900 BLOOD TYPING SEROLOGIC ABO: CPT | Performed by: OBSTETRICS & GYNECOLOGY

## 2017-05-22 PROCEDURE — 87491 CHLMYD TRACH DNA AMP PROBE: CPT | Performed by: OBSTETRICS & GYNECOLOGY

## 2017-05-22 PROCEDURE — 86901 BLOOD TYPING SEROLOGIC RH(D): CPT | Performed by: OBSTETRICS & GYNECOLOGY

## 2017-05-22 PROCEDURE — 87086 URINE CULTURE/COLONY COUNT: CPT | Performed by: OBSTETRICS & GYNECOLOGY

## 2017-05-22 PROCEDURE — 86780 TREPONEMA PALLIDUM: CPT | Performed by: OBSTETRICS & GYNECOLOGY

## 2017-05-22 PROCEDURE — 86850 RBC ANTIBODY SCREEN: CPT | Performed by: OBSTETRICS & GYNECOLOGY

## 2017-05-22 PROCEDURE — 86762 RUBELLA ANTIBODY: CPT | Performed by: OBSTETRICS & GYNECOLOGY

## 2017-05-22 NOTE — PROGRESS NOTES
Roselyn is a 32 year old  @  10w0d weeks here for new ob visit.      See Ob questionnaire for pertinent components of HPI.  Current Issues include: Sab symptoms:  Spotting/postcoital  nausea, moderate    Obstetric History       T2      TAB0   SAB2   E0   M0   L2       # Outcome Date GA Lbr Abisai/2nd Weight Sex Delivery Anes PTL Lv   5 Current            4 SAB 17           3 Term 06/08/15   8 lb 1 oz (3.657 kg) F Vag-Spont   Y      Name: Sheryl   2 SAB 07/10/14           1 Term 12 39w4d  7 lb 10 oz (3.459 kg) F    Y      Name: Nikki          Past Surgical History:   Procedure Laterality Date     DENTAL SURGERY      wisdom teeth      Past Medical History:   Diagnosis Date     NO ACTIVE PROBLEMS      Past Surgical History:   Procedure Laterality Date     DENTAL SURGERY      wisdom teeth      Patient Active Problem List    Diagnosis Date Noted     Need for Tdap vaccination 04/10/2015     Priority: Medium     Given 4/10/15  Ann Penaloza        Encounter for supervision of other normal pregnancy 2014     Priority: Medium     Diagnosis updated by automated process. Provider to review and confirm.        No Known Allergies  Current Outpatient Prescriptions   Medication Sig Dispense Refill     Ferrous Sulfate (IRON SUPPLEMENT PO) Take by mouth daily (with breakfast)       Prenatal Vit-Fe Fumarate-FA (PRENATAL MULTIVITAMIN  PLUS IRON) 27-0.8 MG TABS per tablet Take 1 tablet by mouth daily         Past Medical History of Father of Baby:   No significant medical history    Physical Exam: /72 (BP Location: Left arm, Patient Position: Chair, Cuff Size: Adult Regular)  Pulse 70  Temp 98.3  F (36.8  C) (Tympanic)  Wt 156 lb 9.6 oz (71 kg)  LMP 2017  SpO2 100%  BMI 25.66 kg/m2  General: Well developed, well nourished female  Skin: Normal  HEENT: Normal  Neck: Supple,no adenopathy,thyroid normal  Chest: Clear  Heart: Regular rate, rhythm,No murmur, rub, gallop  Breasts:  No masses, skin, nipple or axillary changes   Abdomen: Benign,Soft, flat, non-tender,No masses, organomegaly,No inguinal nodes,Bowel sounds normoactive   Extremities: Normal  Neurological: Normal   Perineum: Intact   Vulva: Normal  Vagina: Normal mucosa, no discharge  Cervix: Parous, closed, mobile, no discharge  Uterus: 10 weeks, Normal shape, position and consistency   Adnexa: Normal  Rectum: deferred, Normal without lesion or mass   Bony Pelvis: Adequate       A/P 32 year old  at  10w0d weeks    ICD-10-CM    1. Encounter for supervision of other normal pregnancy Z34.80 Ferrous Sulfate (IRON SUPPLEMENT PO)     ABO/Rh type and screen     Anti Treponema     CBC with platelets differential     Chlamydia trachomatis PCR     Neisseria gonorrhoeae PCR     Hepatitis B surface antigen     Rubella Antibody IgG Quantitative     HIV Antigen Antibody Combo     Urine Culture Aerobic Bacterial       - Discussed physician coverage, tertiary support, diet, exercise, weight gain, schedule of visits, routine and indicated ultrasounds, and childbirth education.    - Options for  testing for chromosomal and birth defects were discussed with the patient.  Diagnostic tests include CVS and Amniocentesis.  We discussed that these tests are definitive but invasive and do carry a risk of fetal loss.    Screening tests include nuchal translucency/blood marker testing in the first trimester and quad screening in the second trimester.  We discussed that these are screening tests and not diagnostic tests and that false positives and negatives are a distinct possibility.        - Prenatal labs, pap, GC, Chlamydia    - Prenatal Vitamins

## 2017-05-22 NOTE — MR AVS SNAPSHOT
After Visit Summary   5/22/2017    Roselyn Butler    MRN: [id number]           Patient Information     Date Of Birth          1985        Visit Information        Provider Department      5/22/2017 4:45 PM Agbeh, Cephas Mawuena, MD [facility]        Care Instructions                                                           If you have any questions regarding your visit, Please contact your care team.    Women s Health CLINIC HOURS TELEPHONE NUMBER   Cephas Agbeh, M.D.    Ann - JOHN Maradiaga- FENG Araujo - RN    Jayda -         Monday-[facility]  8:00 am - 5 pm  Tuesday- [facility]  8:00am- 5 pm  Wednesday- Off  Thursday- [facility]  8:00 am- 5 pm  Friday-[place]  8:00 am 5 pm [facility]  [address] [place] [address]  [phone] ask for Women's [facility]    Imaging [phone]    [facility]  [address] [facility] [address]  [place] [address]  [phone]  Imaging [phone]     Urgent Care locations:    [facility] Saturday and Sunday   9 am - 5 pm    Monday-Friday   12 pm - 8 pm  Saturday and Sunday   9 am - 5 pm   [phone]       If you need a medication refill, please contact your pharmacy. Please allow 3 business days for your refill to be completed.  As always, Thank you for trusting us with your healthcare needs!               Follow-ups after your visit        Your next 10 appointments already scheduled     May 22, 2017  4:45 PM CDT   New Prenatal with Cephas Mawuena Agbeh, MD   [facility] [place] ([facility])    [address] [facility] [address] [facility] [address]   [phone]            Jun 23, 2017  1:00 PM CDT   ESTABLISHED PRENATAL with Cephas Mawuena Agbeh, MD   [facility] ([facility])    [address] [address]   [phone]            Feb 16, 2018  9:45 AM CST   Return Visit  with Nusrat Wilson MD   [facility] ([facility])    [address] [place] [address] [phone]              Who to contact     If you have questions or need follow up information about today's clinic visit or your schedule please contact [facility] directly at [phone].  Normal or non-critical lab and imaging results will be communicated to you by MyChart, letter or phone within 4 business days after the clinic has received the results. If you do not hear from us within 7 days, please contact the clinic through [employer] or phone. If you have a critical or abnormal lab result, we will notify you by phone as soon as possible.  Submit refill requests through [employer] or call your pharmacy and they will forward the refill request to us. Please allow 3 business days for your refill to be completed.          Additional Information About Your Visit        [employer] Information     [employer] gives you secure access to your electronic health record. If you see a primary care provider, you can also send messages to your care team and make appointments. If you have questions, please call your primary care clinic.  If you do not have a primary care provider, please call [phone] and they will assist you.        Care EveryWhere ID     This is your Care EveryWhere ID. This could be used by other organizations to access your [place] medical records  [id number]        Your Vitals Were     Pulse Temperature Last Period Pulse Oximetry BMI (Body Mass Index)       70 98.3  F (36.8  C) (Tympanic) 03/12/2017 100% 25.66 kg/m2        Blood Pressure from Last 3 Encounters:   05/22/17 114/72   04/28/17 115/74   03/30/17 125/75    Weight from Last 3 Encounters:   05/22/17 156 lb 9.6 oz (71 kg)   04/28/17 157 lb 6.4 oz (71.4 kg)   01/16/17 157 lb (71.2 kg)              Today, you had the following     No orders found for display       Primary Care Provider Office Phone #  Fax #    Amber Mawuena Agbeh, [phone]       [facility] [address] [place] [address]        Thank you!     Thank you for choosing [facility]  for your care. Our goal is always to provide you with excellent care. Hearing back from our patients is one way we can continue to improve our services. Please take a few minutes to complete the written survey that you may receive in the mail after your visit with us. Thank you!             Your Updated Medication List - Protect others around you: Learn how to safely use, store and throw away your medicines at www.disposemymeds.org.          This list is accurate as of: 5/22/17  4:40 PM.  Always use your most recent med list.                   Brand Name Dispense Instructions for use    IRON SUPPLEMENT PO      Take by mouth daily (with breakfast)       prenatal multivitamin  plus iron 27-0.8 MG Tabs per tablet      Take 1 tablet by mouth daily

## 2017-05-22 NOTE — PATIENT INSTRUCTIONS
If you have any questions regarding your visit, Please contact your care team.    Women s Health CLINIC HOURS TELEPHONE NUMBER   Ambers Agbeh, M.D.    Ann Maradiaga- FENG Araujo - FENG Montelongo -         Monday-[facility]  8:00 am - 5 pm  Tuesday- [facility]  8:00am- 5 pm  Wednesday- Off  Thursday- [facility]  8:00 am- 5 pm  Friday-[place]  8:00 am 5 pm [facility]  [address] [place] [address]  [phone] ask for Women's [facility]    Imaging [phone]    [facility]  [address]  [place] [address] [phone]  Imaging [phone]     Urgent Care locations:    [facility] Saturday and Sunday   9 am - 5 pm    Monday-Friday   12 pm - 8 pm  Saturday and Sunday   9 am - 5 pm   [phone]       If you need a medication refill, please contact your pharmacy. Please allow 3 business days for your refill to be completed.  As always, Thank you for trusting us with your healthcare needs!

## 2017-05-22 NOTE — NURSING NOTE
"Chief Complaint   Patient presents with     Prenatal Care     First OB       Initial /72 (BP Location: Left arm, Patient Position: Chair, Cuff Size: Adult Regular)  Pulse 70  Temp 98.3  F (36.8  C) (Tympanic)  Wt 156 lb 9.6 oz (71 kg)  LMP 03/12/2017  SpO2 100%  BMI 25.66 kg/m2 Estimated body mass index is 25.66 kg/(m^2) as calculated from the following:    Height as of 1/16/17: 5' 5.5\" (1.664 m).    Weight as of this encounter: 156 lb 9.6 oz (71 kg).  Medication Reconciliation: complete   "

## 2017-05-23 LAB
ABO + RH BLD: NORMAL
ABO + RH BLD: NORMAL
BACTERIA SPEC CULT: NO GROWTH
BLD GP AB SCN SERPL QL: NORMAL
BLOOD BANK CMNT PATIENT-IMP: NORMAL
HBV SURFACE AG SERPL QL IA: NONREACTIVE
HIV 1+2 AB+HIV1 P24 AG SERPL QL IA: NORMAL
MICRO REPORT STATUS: NORMAL
RUBV IGG SERPL IA-ACNC: 15 IU/ML
SPECIMEN EXP DATE BLD: NORMAL
SPECIMEN SOURCE: NORMAL
T PALLIDUM IGG+IGM SER QL: NEGATIVE

## 2017-05-24 LAB
C TRACH DNA SPEC QL NAA+PROBE: NORMAL
N GONORRHOEA DNA SPEC QL NAA+PROBE: NORMAL
SPECIMEN SOURCE: NORMAL
SPECIMEN SOURCE: NORMAL

## 2017-06-23 ENCOUNTER — PRENATAL OFFICE VISIT (OUTPATIENT)
Dept: OBGYN | Facility: CLINIC | Age: 32
End: 2017-06-23
Payer: COMMERCIAL

## 2017-06-23 VITALS
HEART RATE: 74 BPM | BODY MASS INDEX: 26.29 KG/M2 | WEIGHT: 160.4 LBS | OXYGEN SATURATION: 100 % | SYSTOLIC BLOOD PRESSURE: 100 MMHG | DIASTOLIC BLOOD PRESSURE: 64 MMHG

## 2017-06-23 DIAGNOSIS — Z34.80 ENCOUNTER FOR SUPERVISION OF OTHER NORMAL PREGNANCY: Primary | ICD-10-CM

## 2017-06-23 PROCEDURE — 99207 ZZC PRENATAL VISIT: CPT | Performed by: OBSTETRICS & GYNECOLOGY

## 2017-06-23 NOTE — PROGRESS NOTES
14w4d Eating well except some lactose intolerance with vanilla ice cream.    No other complaints. return to clinic in 4 weeks.    ICD-10-CM    1. Encounter for supervision of other normal pregnancy Z34.80      CEPHAS AGBEH, MD.

## 2017-06-23 NOTE — MR AVS SNAPSHOT
After Visit Summary   6/23/2017    Roselyn Butler    MRN: [id number]           Patient Information     Date Of Birth          1985        Visit Information        Provider Department      6/23/2017 1:00 PM Agbeh, Cephas Mawuena, MD [facility]        Care Instructions                                                           If you have any questions regarding your visit, Please contact your care team.    [facility] CLINIC HOURS TELEPHONE NUMBER   Cephas Agbeh, M.D. Kristen - JOHN Maradiaga- FENG Araujo - RN    Jayda -         Monday-[facility]  8:00 am - 5 pm  Tuesday- [facility]  8:00am- 5 pm  Wednesday- Off  Thursday- [facility]  8:00 am- 5 pm  Friday-[place]  8:00 am 5 pm [facility]  [address]  [place], [place] [address] [phone] ask for [facility]    Imaging [phone]    [facility]  [address] [place], [place] [address] [phone]  Imaging [phone]     Urgent Care locations:    [facility] Saturday and Sunday   9 am - 5 pm    Monday-Friday   12 pm - 8 pm  Saturday and Sunday   9 am - 5 pm   [phone]       If you need a medication refill, please contact your pharmacy. Please allow 3 business days for your refill to be completed.  As always, Thank you for trusting us with your healthcare needs!                  Follow-ups after your visit        Your next 10 appointments already scheduled     Jul 21, 2017 10:00 AM CDT   ESTABLISHED PRENATAL with Cephas Mawuena Agbeh, MD   [facility] [place] ([facility])    [address] [address] [phone]            Feb 16, 2018  9:45 AM CST   Return Visit with Nusrat Wilson MD   [facility]    [address] [facility] [address] [phone]              Who to contact     If you have  questions or need follow up information about today's clinic visit or your schedule please contact [facility] directly at [phone].  Normal or non-critical lab and imaging results will be communicated to you by MyChart, letter or phone within 4 business days after the clinic has received the results. If you do not hear from us within 7 days, please contact the clinic through [employer] or phone. If you have a critical or abnormal lab result, we will notify you by phone as soon as possible.  Submit refill requests through [employer] or call your pharmacy and they will forward the refill request to us. Please allow 3 business days for your refill to be completed.          Additional Information About Your Visit        [employer] Information     [employer] gives you secure access to your electronic health record. If you see a primary care provider, you can also send messages to your care team and make appointments. If you have questions, please call your primary care clinic.  If you do not have a primary care provider, please call [phone] and they will assist you.        Care EveryWhere ID     This is your Care EveryWhere ID. This could be used by other organizations to access your [place] medical records  [id number]        Your Vitals Were     Pulse Last Period Pulse Oximetry Breastfeeding? BMI (Body Mass Index)       74 03/12/2017 100% No 26.29 kg/m2        Blood Pressure from Last 3 Encounters:   06/23/17 100/64   05/22/17 114/72   04/28/17 115/74    Weight from Last 3 Encounters:   06/23/17 160 lb 6.4 oz (72.8 kg)   05/22/17 156 lb 9.6 oz (71 kg)   04/28/17 157 lb 6.4 oz (71.4 kg)              Today, you had the following     No orders found for display       Primary Care Provider Office Phone # Fax #    Amber Mawuena Agbeh, [phone]       [facility] [address] [place] [address]        Equal Access to Services     JEFF SETHI AH: Lynne neff  Xiang, eddieda lugigiadaha, qavanita karomelia hagen, vaughn florianin hayaan aronsacha pierrelinda laNahidjesenia heavenly. So [facility] [phone].    ATENCIÓN: Si familia robles, tiene a barrientos disposición servicios gratuitos de asistencia lingüística. Shanelle al [phone].    We comply with applicable federal civil rights laws and [place] laws. We do not discriminate on the basis of race, color, national origin, age, disability sex, sexual orientation or gender identity.            Thank you!     Thank you for choosing [facility]  for your care. Our goal is always to provide you with excellent care. Hearing back from our patients is one way we can continue to improve our services. Please take a few minutes to complete the written survey that you may receive in the mail after your visit with us. Thank you!             Your Updated Medication List - Protect others around you: Learn how to safely use, store and throw away your medicines at www.disposemymeds.org.          This list is accurate as of: 6/23/17  1:06 PM.  Always use your most recent med list.                   Brand Name Dispense Instructions for use Diagnosis    IRON SUPPLEMENT PO      Take by mouth daily (with breakfast)    Encounter for supervision of other normal pregnancy       prenatal multivitamin  plus iron 27-0.8 MG Tabs per tablet      Take 1 tablet by mouth daily    Pregnancy examination or test, pregnancy unconfirmed, Recurrent pregnancy loss (CODE)

## 2017-06-23 NOTE — NURSING NOTE
"Chief Complaint   Patient presents with     Prenatal Care     14w4d       Initial /64  Pulse 74  Wt 160 lb 6.4 oz (72.8 kg)  LMP 03/12/2017  SpO2 100%  Breastfeeding? No  BMI 26.29 kg/m2 Estimated body mass index is 26.29 kg/(m^2) as calculated from the following:    Height as of 1/16/17: 5' 5.5\" (1.664 m).    Weight as of this encounter: 160 lb 6.4 oz (72.8 kg).  Medication Reconciliation:   Gisela Mcbride, AMAN  June 23, 2017 1:05 PM        "

## 2017-06-23 NOTE — PATIENT INSTRUCTIONS
If you have any questions regarding your visit, Please contact your care team.    Women s Health CLINIC HOURS TELEPHONE NUMBER   Ambers Agbeh, M.D.    Ann Maradiaga- FENG Araujo - FENG Montelongo -         Monday-[facility]  8:00 am - 5 pm  Tuesday- [facility]  8:00am- 5 pm  Wednesday- Off  Thursday- [facility]  8:00 am- 5 pm  Friday-[place]  8:00 am 5 pm [facility]  [address] [place] [address]  [phone] ask for [facility]'s [facility]    Imaging [phone]    [facility]  [address]  [place] [address] [phone]  Imaging [phone]     Urgent Care locations:    [facility] Saturday and Sunday   9 am - 5 pm    Monday-Friday   12 pm - 8 pm  Saturday and Sunday   9 am - 5 pm   [phone]       If you need a medication refill, please contact your pharmacy. Please allow 3 business days for your refill to be completed.  As always, Thank you for trusting us with your healthcare needs!

## 2017-07-10 ENCOUNTER — TELEPHONE (OUTPATIENT)
Dept: OBGYN | Facility: CLINIC | Age: 32
End: 2017-07-10

## 2017-07-20 ENCOUNTER — PRENATAL OFFICE VISIT (OUTPATIENT)
Dept: OBGYN | Facility: CLINIC | Age: 32
End: 2017-07-20
Payer: COMMERCIAL

## 2017-07-20 VITALS
SYSTOLIC BLOOD PRESSURE: 102 MMHG | OXYGEN SATURATION: 99 % | BODY MASS INDEX: 26.71 KG/M2 | TEMPERATURE: 97.1 F | WEIGHT: 163 LBS | HEART RATE: 62 BPM | DIASTOLIC BLOOD PRESSURE: 66 MMHG

## 2017-07-20 DIAGNOSIS — Z34.80 ENCOUNTER FOR SUPERVISION OF OTHER NORMAL PREGNANCY: Primary | ICD-10-CM

## 2017-07-20 PROCEDURE — 99207 ZZC PRENATAL VISIT: CPT | Performed by: OBSTETRICS & GYNECOLOGY

## 2017-07-20 NOTE — PROGRESS NOTES
18w3d. Doing well without issues/concerns.  Quad screen not done per pt request .  Routine anticipatory guidance.  U/S  Ordered. return to clinic in 4 weeks.    ICD-10-CM    1. Encounter for supervision of other normal pregnancy Z34.80 US OB > 14 Weeks Complete Single     CEPHAS AGBEH, MD.

## 2017-07-20 NOTE — MR AVS SNAPSHOT
After Visit Summary   7/20/2017    Roselyn Butler    MRN: [id number]           Patient Information     Date Of Birth          1985        Visit Information        Provider Department      7/20/2017 11:00 AM Agbeh, Cephas Mawuena, MD [facility]        Care Instructions                                                           If you have any questions regarding your visit, Please contact your care team.    [facility] CLINIC HOURS TELEPHONE NUMBER   Cephas Agbeh, M.D. Kristen - JOHN Maradiaga- FENG Araujo - RN    Jayda -         Monday-[facility]  8:00 am - 5 pm  Tuesday- [facility]  8:00am- 5 pm  Wednesday- Off  Thursday- [facility]  8:00 am- 5 pm  Friday-[place]  8:00 am 5 pm [facility]  [address]  [place], [place] [address] [phone] ask for [facility]    Imaging [phone]    [facility]  [address] [place], [place] [address] [phone]  Imaging [phone]     Urgent Care locations:    [facility] Saturday and Sunday   9 am - 5 pm    Monday-Friday   12 pm - 8 pm  Saturday and Sunday   9 am - 5 pm   [phone]       If you need a medication refill, please contact your pharmacy. Please allow 3 business days for your refill to be completed.  As always, Thank you for trusting us with your healthcare needs!                 Follow-ups after your visit        Your next 10 appointments already scheduled     Aug 18, 2017  9:00 AM CDT   ESTABLISHED PRENATAL with Cephas Mawuena Agbeh, MD   [facility] [place] ([facility])    [address] [facility] [address] [phone]            Feb 16, 2018  9:45 AM CST   Return Visit with Nusrat Wilson MD   [facility]    [address] [facility] [address] [phone]              Who to contact     If you have  questions or need follow up information about today's clinic visit or your schedule please contact [facility] directly at [phone].  Normal or non-critical lab and imaging results will be communicated to you by MyChart, letter or phone within 4 business days after the clinic has received the results. If you do not hear from us within 7 days, please contact the clinic through [employer] or phone. If you have a critical or abnormal lab result, we will notify you by phone as soon as possible.  Submit refill requests through [employer] or call your pharmacy and they will forward the refill request to us. Please allow 3 business days for your refill to be completed.          Additional Information About Your Visit        [employer] Information     [employer] gives you secure access to your electronic health record. If you see a primary care provider, you can also send messages to your care team and make appointments. If you have questions, please call your primary care clinic.  If you do not have a primary care provider, please call [phone] and they will assist you.        Care EveryWhere ID     This is your Care EveryWhere ID. This could be used by other organizations to access your [place] medical records  [id number]        Your Vitals Were     Pulse Temperature Last Period Pulse Oximetry BMI (Body Mass Index)       62 97.1  F (36.2  C) (Tympanic) 03/12/2017 99% 26.71 kg/m2        Blood Pressure from Last 3 Encounters:   07/20/17 102/66   06/23/17 100/64   05/22/17 114/72    Weight from Last 3 Encounters:   07/20/17 163 lb (73.9 kg)   06/23/17 160 lb 6.4 oz (72.8 kg)   05/22/17 156 lb 9.6 oz (71 kg)              Today, you had the following     No orders found for display       Primary Care Provider Office Phone # Fax #    Amber Mawuena Agbeh, [phone]       [facility] [address] [place] [address]        Equal Access to Services     JEFF SETHI AH: Julyii yomi hancock  aishwarya Otoole, walynda lugigiadaha, qavanita karomelia hagen, vaughn florianin hayaacarri sharpsacha pierrelinda laNahidjesenia heavenly. So [facility] [phone].    ATENCIÓN: Si habla español, tiene a barrientos disposición servicios gratuitos de asistencia lingüística. Shanelle al [phone].    We comply with applicable federal civil rights laws and [place] laws. We do not discriminate on the basis of race, color, national origin, age, disability sex, sexual orientation or gender identity.            Thank you!     Thank you for choosing [facility]  for your care. Our goal is always to provide you with excellent care. Hearing back from our patients is one way we can continue to improve our services. Please take a few minutes to complete the written survey that you may receive in the mail after your visit with us. Thank you!             Your Updated Medication List - Protect others around you: Learn how to safely use, store and throw away your medicines at www.disposemymeds.org.          This list is accurate as of: 7/20/17 11:03 AM.  Always use your most recent med list.                   Brand Name Dispense Instructions for use Diagnosis    IRON SUPPLEMENT PO      Take by mouth daily (with breakfast)    Encounter for supervision of other normal pregnancy       prenatal multivitamin  plus iron 27-0.8 MG Tabs per tablet      Take 1 tablet by mouth daily    Pregnancy examination or test, pregnancy unconfirmed, Recurrent pregnancy loss (CODE)

## 2017-07-20 NOTE — PATIENT INSTRUCTIONS
If you have any questions regarding your visit, Please contact your care team.    Women s Health CLINIC HOURS TELEPHONE NUMBER   Ambers Agbeh, M.D.    Ann Maradiaga- FENG Araujo - FENG Montelongo -         Monday-[facility]  8:00 am - 5 pm  Tuesday- [facility]  8:00am- 5 pm  Wednesday- Off  Thursday- [facility]  8:00 am- 5 pm  Friday-[place]  8:00 am 5 pm [facility]  [address] [place] [address]  [phone] ask for Women's [facility]    Imaging [phone]    [facility]  [address] [facility]  [place] [address] [phone]  Imaging [phone]     Urgent Care locations:    [facility] Saturday and Sunday   9 am - 5 pm    Monday-Friday   12 pm - 8 pm  Saturday and Sunday   9 am - 5 pm   [phone]       If you need a medication refill, please contact your pharmacy. Please allow 3 business days for your refill to be completed.  As always, Thank you for trusting us with your healthcare needs!

## 2017-07-20 NOTE — NURSING NOTE
"Chief Complaint   Patient presents with     Prenatal Care     18.3       Initial /66 (BP Location: Left arm, Patient Position: Chair, Cuff Size: Adult Regular)  Pulse 62  Temp 97.1  F (36.2  C) (Tympanic)  Wt 163 lb (73.9 kg)  LMP 03/12/2017  SpO2 99%  BMI 26.71 kg/m2 Estimated body mass index is 26.71 kg/(m^2) as calculated from the following:    Height as of 1/16/17: 5' 5.5\" (1.664 m).    Weight as of this encounter: 163 lb (73.9 kg).  Medication Reconciliation: complete   Ann Penaloza LPN    "

## 2017-08-01 ENCOUNTER — RADIANT APPOINTMENT (OUTPATIENT)
Dept: ULTRASOUND IMAGING | Facility: CLINIC | Age: 32
End: 2017-08-01
Attending: OBSTETRICS & GYNECOLOGY
Payer: COMMERCIAL

## 2017-08-01 DIAGNOSIS — Z34.80 ENCOUNTER FOR SUPERVISION OF OTHER NORMAL PREGNANCY: ICD-10-CM

## 2017-08-01 PROCEDURE — 76805 OB US >/= 14 WKS SNGL FETUS: CPT

## 2017-08-17 ENCOUNTER — PRENATAL OFFICE VISIT (OUTPATIENT)
Dept: OBGYN | Facility: CLINIC | Age: 32
End: 2017-08-17
Payer: COMMERCIAL

## 2017-08-17 VITALS
TEMPERATURE: 97.1 F | BODY MASS INDEX: 27.47 KG/M2 | OXYGEN SATURATION: 99 % | WEIGHT: 167.6 LBS | DIASTOLIC BLOOD PRESSURE: 64 MMHG | SYSTOLIC BLOOD PRESSURE: 102 MMHG | HEART RATE: 62 BPM

## 2017-08-17 DIAGNOSIS — Z34.80 ENCOUNTER FOR SUPERVISION OF OTHER NORMAL PREGNANCY: Primary | ICD-10-CM

## 2017-08-17 PROCEDURE — 99207 ZZC PRENATAL VISIT: CPT | Performed by: OBSTETRICS & GYNECOLOGY

## 2017-08-17 NOTE — NURSING NOTE
"Chief Complaint   Patient presents with     Prenatal Care     22.3       Initial LMP 03/12/2017 Estimated body mass index is 26.71 kg/(m^2) as calculated from the following:    Height as of 1/16/17: 5' 5.5\" (1.664 m).    Weight as of 7/20/17: 163 lb (73.9 kg).  Medication Reconciliation: complete     Ann Penaloza LPN    "

## 2017-08-17 NOTE — PROGRESS NOTES
22w3d  Doing well without issues/concerns.  Routine anticipatory guidance.  US was normal.  RTC 4wk.  Plan for  GCT, Hgb at next visit.    ICD-10-CM    1. Encounter for supervision of other normal pregnancy Z34.80      CEPHAS AGBEH, MD.

## 2017-08-17 NOTE — MR AVS SNAPSHOT
After Visit Summary   8/17/2017    Roselyn Butler    MRN: [id number]           Patient Information     Date Of Birth          1985        Visit Information        Provider Department      8/17/2017 11:00 AM Agbeh, Cephas Mawuena, MD [facility]        Care Instructions                                                           If you have any questions regarding your visit, Please contact your care team.    [facility] CLINIC HOURS TELEPHONE NUMBER   Cephas Agbeh, M.D. Kristen - JOHN Maradiaga- FENG Araujo - RN    Jayda -         Monday-[facility]  8:00 am - 5 pm  Tuesday- [facility]  8:00am- 5 pm  Wednesday- Off  Thursday- [facility]  8:00 am- 5 pm  Friday-[place]  8:00 am 5 pm [facility]  [address]  [place], [place] [address] [phone] ask for [facility]    Imaging [phone]    [facility]  [address] [place], [place] [address] [phone]  Imaging [phone]     Urgent Care locations:    [facility] Saturday and Sunday   9 am - 5 pm    Monday-Friday   12 pm - 8 pm  Saturday and Sunday   9 am - 5 pm   [phone]       If you need a medication refill, please contact your pharmacy. Please allow 3 business days for your refill to be completed.  As always, Thank you for trusting us with your healthcare needs!                 Follow-ups after your visit        Your next 10 appointments already scheduled     Sep 11, 2017  4:30 PM CDT   ESTABLISHED PRENATAL with Cephas Mawuena Agbeh, MD   [facility] ([facility])    [address] [facility] [address] [phone]            Feb 16, 2018  9:45 AM CST   Return Visit with Nusrat Wilson MD   [facility]    [address] [facility] [id number] [phone]              Who to contact     If you have  questions or need follow up information about today's clinic visit or your schedule please contact [facility] directly at [phone].  Normal or non-critical lab and imaging results will be communicated to you by MyChart, letter or phone within 4 business days after the clinic has received the results. If you do not hear from us within 7 days, please contact the clinic through [employer] or phone. If you have a critical or abnormal lab result, we will notify you by phone as soon as possible.  Submit refill requests through [employer] or call your pharmacy and they will forward the refill request to us. Please allow 3 business days for your refill to be completed.          Additional Information About Your Visit        [employer] Information     [employer] gives you secure access to your electronic health record. If you see a primary care provider, you can also send messages to your care team and make appointments. If you have questions, please call your primary care clinic.  If you do not have a primary care provider, please call [phone] and they will assist you.        Care EveryWhere ID     This is your Care EveryWhere ID. This could be used by other organizations to access your [place] medical records  [id number]        Your Vitals Were     Pulse Temperature Last Period Pulse Oximetry BMI (Body Mass Index)       62 97.1  F (36.2  C) (Tympanic) 03/12/2017 99% 27.47 kg/m2        Blood Pressure from Last 3 Encounters:   08/17/17 102/64   07/20/17 102/66   06/23/17 100/64    Weight from Last 3 Encounters:   08/17/17 167 lb 9.6 oz (76 kg)   07/20/17 163 lb (73.9 kg)   06/23/17 160 lb 6.4 oz (72.8 kg)              Today, you had the following     No orders found for display       Primary Care Provider Office Phone # Fax #    Maber Mawuena Agbeh, [phone] [address] [place] [address]        Equal Access to Services     JEFF SETHI AH: Lynne Otoole  walynarmando fuentes, qaybta karomelia hagen, vaughn moses pierrelidna gonzalezjesenia ah. So [facility] [phone].    ATENCIÓN: Si familia robles, tiene a barrientos disposición servicios gratuitos de asistencia lingüística. Llame al [phone].    We comply with applicable federal civil rights laws and [place] laws. We do not discriminate on the basis of race, color, national origin, age, disability sex, sexual orientation or gender identity.            Thank you!     Thank you for choosing [facility]  for your care. Our goal is always to provide you with excellent care. Hearing back from our patients is one way we can continue to improve our services. Please take a few minutes to complete the written survey that you may receive in the mail after your visit with us. Thank you!             Your Updated Medication List - Protect others around you: Learn how to safely use, store and throw away your medicines at www.disposemymeds.org.          This list is accurate as of: 8/17/17 11:08 AM.  Always use your most recent med list.                   Brand Name Dispense Instructions for use Diagnosis    IRON SUPPLEMENT PO      Take by mouth daily (with breakfast)    Encounter for supervision of other normal pregnancy       prenatal multivitamin plus iron 27-0.8 MG Tabs per tablet      Take 1 tablet by mouth daily    Pregnancy examination or test, pregnancy unconfirmed, Recurrent pregnancy loss (CODE)

## 2017-08-17 NOTE — PATIENT INSTRUCTIONS
If you have any questions regarding your visit, Please contact your care team.    Women s Health CLINIC HOURS TELEPHONE NUMBER   Ambers Agbeh, M.D.    Ann Maradiaga- FENG Araujo - FENG Montelongo -         Monday-[facility]  8:00 am - 5 pm  Tuesday- [facility]  8:00am- 5 pm  Wednesday- Off  Thursday- [facility]  8:00 am- 5 pm  Friday-[place]  8:00 am 5 pm [facility]  [address] [place] [address]  [phone] ask for [facility]    Imaging [phone]    [facility]  [address] [facility]  [place] [address] [phone]  Imaging [phone]     Urgent Care locations:    [facility] Saturday and Sunday   9 am - 5 pm    Monday-Friday   12 pm - 8 pm  Saturday and Sunday   9 am - 5 pm   [phone]       If you need a medication refill, please contact your pharmacy. Please allow 3 business days for your refill to be completed.  As always, Thank you for trusting us with your healthcare needs!

## 2017-09-11 ENCOUNTER — PRENATAL OFFICE VISIT (OUTPATIENT)
Dept: OBGYN | Facility: CLINIC | Age: 32
End: 2017-09-11
Payer: COMMERCIAL

## 2017-09-11 VITALS
SYSTOLIC BLOOD PRESSURE: 104 MMHG | DIASTOLIC BLOOD PRESSURE: 68 MMHG | WEIGHT: 172 LBS | TEMPERATURE: 98.9 F | BODY MASS INDEX: 28.19 KG/M2 | OXYGEN SATURATION: 97 % | HEART RATE: 83 BPM

## 2017-09-11 DIAGNOSIS — Z23 NEED FOR PROPHYLACTIC VACCINATION AND INOCULATION AGAINST INFLUENZA: ICD-10-CM

## 2017-09-11 DIAGNOSIS — Z34.80 ENCOUNTER FOR SUPERVISION OF OTHER NORMAL PREGNANCY: Primary | ICD-10-CM

## 2017-09-11 LAB — HGB BLD-MCNC: 11.5 G/DL (ref 11.7–15.7)

## 2017-09-11 PROCEDURE — 36415 COLL VENOUS BLD VENIPUNCTURE: CPT | Performed by: OBSTETRICS & GYNECOLOGY

## 2017-09-11 PROCEDURE — 82950 GLUCOSE TEST: CPT | Performed by: OBSTETRICS & GYNECOLOGY

## 2017-09-11 PROCEDURE — 86780 TREPONEMA PALLIDUM: CPT | Performed by: OBSTETRICS & GYNECOLOGY

## 2017-09-11 PROCEDURE — 99207 ZZC PRENATAL VISIT: CPT | Performed by: OBSTETRICS & GYNECOLOGY

## 2017-09-11 PROCEDURE — 90686 IIV4 VACC NO PRSV 0.5 ML IM: CPT | Performed by: OBSTETRICS & GYNECOLOGY

## 2017-09-11 PROCEDURE — 90471 IMMUNIZATION ADMIN: CPT | Performed by: OBSTETRICS & GYNECOLOGY

## 2017-09-11 PROCEDURE — 00000218 ZZHCL STATISTIC OBHBG - HEMOGLOBIN: Performed by: OBSTETRICS & GYNECOLOGY

## 2017-09-11 NOTE — PATIENT INSTRUCTIONS
If you have any questions regarding your visit, Please contact your care team.    Women s Health CLINIC HOURS TELEPHONE NUMBER   Ambers Agbeh, M.D.    Ann Maradiaga- FENG Araujo - FENG Montelongo -         Monday-[facility]  8:00 am - 5 pm  Tuesday- [facility]  8:00am- 5 pm  Wednesday- Off  Thursday- [facility]  8:00 am- 5 pm  Friday-[place]  8:00 am 5 pm [facility]  [address] [place] [address]  [phone] ask for [facility]    Imaging [phone]    [facility]  [address] [facility]  [place] [address] [phone]  Imaging [phone]     Urgent Care locations:    [facility] Saturday and Sunday   9 am - 5 pm    Monday-Friday   12 pm - 8 pm  Saturday and Sunday   9 am - 5 pm   [phone]       If you need a medication refill, please contact your pharmacy. Please allow 3 business days for your refill to be completed.  As always, Thank you for trusting us with your healthcare needs!

## 2017-09-11 NOTE — NURSING NOTE
"Chief Complaint   Patient presents with     Prenatal Care     26       Initial /68 (BP Location: Left arm, Patient Position: Sitting, Cuff Size: Adult Regular)  Pulse 83  Temp 98.9  F (37.2  C) (Tympanic)  Wt 172 lb (78 kg)  LMP 03/12/2017  SpO2 97%  BMI 28.19 kg/m2 Estimated body mass index is 28.19 kg/(m^2) as calculated from the following:    Height as of 1/16/17: 5' 5.5\" (1.664 m).    Weight as of this encounter: 172 lb (78 kg).  Medication Reconciliation: complete     Ann Penaloza LPN    "

## 2017-09-11 NOTE — PROGRESS NOTES
Injectable Influenza Immunization Documentation    1.  Are you sick today? (Fever of 100.5 or higher on the day of the clinic)   No    2.  Have you ever had Guillain-Peru Syndrome within 6 weeks of an influenza vaccionation?  No    3. Do you have a life-threatening allergy to eggs?  No    4. Do you have a life-threatening allergy to a component of the vaccine? May include antibiotics, gelatin or latex.  No     5. Have you ever had a reaction to a dose of flu vaccine that needed immediate medical attention?  No     Form completed by jai Penaloza LPN

## 2017-09-11 NOTE — MR AVS SNAPSHOT
After Visit Summary   9/11/2017    Roselyn Butler    MRN: [id number]           Patient Information     Date Of Birth          1985        Visit Information        Provider Department      9/11/2017 4:30 PM Agbeh, Cephas Mawuena, MD [facility]        Today's Diagnoses     Encounter for supervision of other normal pregnancy    -  1      Care Instructions                                                           If you have any questions regarding your visit, Please contact your care team.    Women s Health CLINIC HOURS TELEPHONE NUMBER   Cephas Agbeh, M.D. Kristen - LPN Rachael- FENG Araujo - RN    Jayda -         Monday-[facility]  8:00 am - 5 pm  Tuesday- [facility]  8:00am- 5 pm  Wednesday- Off  Thursday- [facility]  8:00 am- 5 pm  Friday-[place]  8:00 am 5 pm [facility]  [address]  [place] [address]  [phone] ask for [facility]    Imaging [phone]    [facility]  [address] [place] [address]  [phone]  Imaging [phone]     Urgent Care locations:    [facility] Saturday and Sunday   9 am - 5 pm    Monday-Friday   12 pm - 8 pm  Saturday and Sunday   9 am - 5 pm   [phone]       If you need a medication refill, please contact your pharmacy. Please allow 3 business days for your refill to be completed.  As always, Thank you for trusting us with your healthcare needs!                 Follow-ups after your visit        Your next 10 appointments already scheduled     Oct 13, 2017  4:30 PM CDT   ESTABLISHED PRENATAL with Cephas Mawuena Agbeh, MD   [facility] ([facility])    [address] [facility] [address] [phone]            Feb 23, 2018  8:30 AM CST   Return Visit with Nusrat Wilson MD   [facility] ([facility])    [address] [place] [address]  [place] [address] [phone]              Who to contact     If you have questions or need follow up information about today's clinic visit or your schedule please contact [facility] directly at [phone].  Normal or non-critical lab and imaging results will be communicated to you by MyChart, letter or phone within 4 business days after the clinic has received the results. If you do not hear from us within 7 days, please contact the clinic through [employer] or phone. If you have a critical or abnormal lab result, we will notify you by phone as soon as possible.  Submit refill requests through [employer] or call your pharmacy and they will forward the refill request to us. Please allow 3 business days for your refill to be completed.          Additional Information About Your Visit        [employer] Information     [employer] gives you secure access to your electronic health record. If you see a primary care provider, you can also send messages to your care team and make appointments. If you have questions, please call your primary care clinic.  If you do not have a primary care provider, please call [phone] and they will assist you.        Care EveryWhere ID     This is your Care EveryWhere ID. This could be used by other organizations to access your [place] medical records  [id number]        Your Vitals Were     Pulse Temperature Last Period Pulse Oximetry BMI (Body Mass Index)       83 98.9  F (37.2  C) (Tympanic) 03/12/2017 97% 28.19 kg/m2        Blood Pressure from Last 3 Encounters:   09/11/17 104/68   08/17/17 102/64   07/20/17 102/66    Weight from Last 3 Encounters:   09/11/17 172 lb (78 kg)   08/17/17 167 lb 9.6 oz (76 kg)   07/20/17 163 lb (73.9 kg)              We Performed the Following     Anti Treponema     Glucose tolerance gest screen 1 hour     OB hemoglobin        Primary Care Provider Office Phone # Fax #    Amber Mawuena Agbeh, [phone] [address] [facility] [place] [address]        Equal Access to Services     JEFF SETHI : Hadii yomi hancock aishwarya Soyoniali, walynda luqadaha, qaybta kaalmada aureliajeffreyarmando, waxay idiin hayrustycarri jaytimothynathaly burdick. So [facility] [phone].    ATENCIÓN: Si habla español, tiene a barrientos disposición servicios gratuitos de asistencia lingüística. Llame al [phone].    We comply with applicable federal civil rights laws and [place] laws. We do not discriminate on the basis of race, color, national origin, age, disability sex, sexual orientation or gender identity.            Thank you!     Thank you for choosing [facility]  for your care. Our goal is always to provide you with excellent care. Hearing back from our patients is one way we can continue to improve our services. Please take a few minutes to complete the written survey that you may receive in the mail after your visit with us. Thank you!             Your Updated Medication List - Protect others around you: Learn how to safely use, store and throw away your medicines at www.disposemymeds.org.          This list is accurate as of: 9/11/17  4:39 PM.  Always use your most recent med list.                   Brand Name Dispense Instructions for use Diagnosis    IRON SUPPLEMENT PO      Take by mouth daily (with breakfast)    Encounter for supervision of other normal pregnancy       prenatal multivitamin plus iron 27-0.8 MG Tabs per tablet      Take 1 tablet by mouth daily    Pregnancy examination or test, pregnancy unconfirmed, Recurrent pregnancy loss (CODE)

## 2017-09-11 NOTE — PROGRESS NOTES
26w0d  Doing well without issues/concerns.  Routine anticipatory guidance.  US was normal.  RTC 4wk.  Glucola given. Plan for  GCT, Hgb.      ICD-10-CM    1. Encounter for supervision of other normal pregnancy Z34.80 Glucose tolerance gest screen 1 hour     OB hemoglobin     Anti Treponema   2. Need for prophylactic vaccination and inoculation against influenza Z23      ADMIN VACCINE, FIRST [58273]     HC FLU VAC PRESRV FREE QUAD SPLIT VIR 3+YRS IM  [07681]     CEPHAS AGBEH, MD.

## 2017-09-12 LAB
GLUCOSE 1H P 50 G GLC PO SERPL-MCNC: 115 MG/DL (ref 60–129)
T PALLIDUM IGG+IGM SER QL: NEGATIVE

## 2017-09-14 ENCOUNTER — MYC MEDICAL ADVICE (OUTPATIENT)
Dept: OBGYN | Facility: CLINIC | Age: 32
End: 2017-09-14

## 2017-09-15 NOTE — TELEPHONE ENCOUNTER
Advise on Iron correct dosing.    Patient can have forms faxed to [facility] at [phone] or [place] at [phone].    Alanis Garcia CMA

## 2017-09-25 ENCOUNTER — TELEPHONE (OUTPATIENT)
Dept: OBGYN | Facility: CLINIC | Age: 32
End: 2017-09-25

## 2017-09-25 NOTE — TELEPHONE ENCOUNTER
Forms received for pt from her employer. Forms completed and faxed per pt request. Sent to scanning.    Ann Penaloza LPN

## 2017-10-13 ENCOUNTER — PRENATAL OFFICE VISIT (OUTPATIENT)
Dept: OBGYN | Facility: CLINIC | Age: 32
End: 2017-10-13
Payer: COMMERCIAL

## 2017-10-13 VITALS
WEIGHT: 174.6 LBS | HEART RATE: 69 BPM | HEIGHT: 66 IN | SYSTOLIC BLOOD PRESSURE: 105 MMHG | DIASTOLIC BLOOD PRESSURE: 66 MMHG | BODY MASS INDEX: 28.06 KG/M2 | OXYGEN SATURATION: 99 %

## 2017-10-13 DIAGNOSIS — Z23 NEED FOR TDAP VACCINATION: Primary | ICD-10-CM

## 2017-10-13 PROCEDURE — 99207 ZZC PRENATAL VISIT: CPT | Performed by: OBSTETRICS & GYNECOLOGY

## 2017-10-13 PROCEDURE — 90471 IMMUNIZATION ADMIN: CPT | Performed by: OBSTETRICS & GYNECOLOGY

## 2017-10-13 PROCEDURE — 90715 TDAP VACCINE 7 YRS/> IM: CPT | Performed by: OBSTETRICS & GYNECOLOGY

## 2017-10-13 ASSESSMENT — PAIN SCALES - GENERAL: PAINLEVEL: NO PAIN (0)

## 2017-10-13 NOTE — PROGRESS NOTES
30w4d.  Tired.  No HA, visual changes, N/V etc. PNE classes planned/done. RTC 2 wk/prn.    ICD-10-CM    1. Need for Tdap vaccination Z23 TDAP VACCINE (ADACEL)     ADMIN 1st VACCINE     CEPHAS AGBEH, MD.

## 2017-10-13 NOTE — NURSING NOTE
"Chief Complaint   Patient presents with     Prenatal Care       Initial /66 (BP Location: Left arm, Patient Position: Chair, Cuff Size: Adult Regular)  Pulse 69  Ht 5' 5.5\" (1.664 m)  Wt 174 lb 9.6 oz (79.2 kg)  LMP 03/12/2017  SpO2 99%  BMI 28.61 kg/m2 Estimated body mass index is 28.61 kg/(m^2) as calculated from the following:    Height as of this encounter: 5' 5.5\" (1.664 m).    Weight as of this encounter: 174 lb 9.6 oz (79.2 kg).  Medication Reconciliation: complete     Cindi Wu MA      Screening Questionnaire for Adult Immunization    Are you sick today?   No   Do you have allergies to medications, food, a vaccine component or latex?   No   Have you ever had a serious reaction after receiving a vaccination?   No   Do you have a long-term health problem with heart disease, lung disease, asthma, kidney disease, metabolic disease (e.g. diabetes), anemia, or other blood disorder?   No   Do you have cancer, leukemia, HIV/AIDS, or any other immune system problem?   No   In the past 3 months, have you taken medications that affect  your immune system, such as prednisone, other steroids, or anticancer drugs; drugs for the treatment of rheumatoid arthritis, Crohn s disease, or psoriasis; or have you had radiation treatments?   No   Have you had a seizure, or a brain or other nervous system problem?   No   During the past year, have you received a transfusion of blood or blood     products, or been given immune (gamma) globulin or antiviral drug?   No   For women: Are you pregnant or is there a chance you could become        pregnant during the next month?   Yes   Have you received any vaccinations in the past 4 weeks?   No     Immunization questionnaire was positive for at least one answer.  Notified provider.        Per orders of Dr. Agbeh, injection of Tdap given by Cindi Wu. Patient instructed to remain in clinic for 15 minutes afterwards, and to report any adverse reaction to me immediately.     "   Screening performed by Cindi Wu on 10/13/2017 at 4:56 PM.

## 2017-10-13 NOTE — PATIENT INSTRUCTIONS
If you have any questions regarding your visit, Please contact your care team.      Women s Health CLINIC HOURS TELEPHONE NUMBER   Cephas Agbeh, M.D. Lisa -      [place]     Monday-[place]  8:00a.m-4:45 p.m  Tuesday--[place]   8:00a.m-4:45 p.m.  Thursday-[place]  8:00a.m-4:45 p.m.  Friday-[facility]  [address] [place] [address]  [phone]    [phone]    [facility]  [address] [facility] [address]  [phone]  Kymjchr-[phone]   Urgent Care locations:    [facility] Monday-Friday  5 pm - 9 pm  Saturday and Sunday   9 am - 5 pm    Monday-Friday   11 pm - 9 pm  Saturday and Sunday   9 am - 5 pm   [phone]     If you need a medication refill, please contact your pharmacy. Please allow 3 business days for your refill to be completed.  As always, Thank you for trusting us with your healthcare needs!

## 2017-10-13 NOTE — MR AVS SNAPSHOT
After Visit Summary   10/13/2017    Roselyn Butler    MRN: [id number]           Patient Information     Date Of Birth          1985        Visit Information        Provider Department      10/13/2017 4:30 PM Agbeh, Cephas Mawuena, MD [facility] [place]        Care Instructions                                                        If you have any questions regarding your visit, Please contact your care team.      Women s Health CLINIC HOURS TELEPHONE NUMBER   Cephas Agbeh, M.D. Lisa -      [place]     [facility]  8:00a.m-4:45 p.m  Tuesday--[place]   8:00a.m-4:45 p.m.  Thursday[facility]  8:00a.m-4:45 p.m.  [facility]  [address] [place] [address]  [phone]    [phone]    [facility]  [address] [facility] [address]  [phone]  [phone]   Urgent Care locations:    [facility] Monday-Friday  5 pm - 9 pm  Saturday and Sunday   9 am - 5 pm    Monday-Friday   11 pm - 9 pm  Saturday and Sunday   9 am - 5 pm   [phone]     If you need a medication refill, please contact your pharmacy. Please allow 3 business days for your refill to be completed.  As always, Thank you for trusting us with your healthcare needs!              Follow-ups after your visit        Your next 10 appointments already scheduled     Oct 26, 2017  4:15 PM CDT   ESTABLISHED PRENATAL with Cephas Mawuena Agbeh, MD   [facility] [place] ([facility] [place])    [address] [facility] [id number] [phone]            Feb 23, 2018  8:30 AM CST   Return Visit with Nusrat Wilson MD   [facility]    [address] [address] [id number] [phone]              Who to contact     If you have questions or need follow up information about today's clinic visit or your schedule please  "contact [facility] [place] directly at [phone].  Normal or non-critical lab and imaging results will be communicated to you by MyChart, letter or phone within 4 business days after the clinic has received the results. If you do not hear from us within 7 days, please contact the clinic through MyChart or phone. If you have a critical or abnormal lab result, we will notify you by phone as soon as possible.  Submit refill requests through Dormzy or call your pharmacy and they will forward the refill request to us. Please allow 3 business days for your refill to be completed.          Additional Information About Your Visit        [employer] Information     Dormzy gives you secure access to your electronic health record. If you see a primary care provider, you can also send messages to your care team and make appointments. If you have questions, please call your primary care clinic.  If you do not have a primary care provider, please call [phone] and they will assist you.        Care EveryWhere ID     This is your Care EveryWhere ID. This could be used by other organizations to access your [place] medical records  [id number]        Your Vitals Were     Pulse Height Last Period Pulse Oximetry BMI (Body Mass Index)       69 5' 5.5\" (1.664 m) 03/12/2017 99% 28.61 kg/m2        Blood Pressure from Last 3 Encounters:   10/13/17 105/66   09/11/17 104/68   08/17/17 102/64    Weight from Last 3 Encounters:   10/13/17 174 lb 9.6 oz (79.2 kg)   09/11/17 172 lb (78 kg)   08/17/17 167 lb 9.6 oz (76 kg)              Today, you had the following     No orders found for display       Primary Care Provider Office Phone # Fax #    Amber Mawuena Agbeh, [phone] [address] [place] [address]        Equal Access to Services     JEFF SETHI AH: Hadii yomi hancock hadasho Soomaali, waaxda luqadaha, qaybta kaalmada adeegyada, vaughn seymour . So [facility] " [phone].    ATENCIÓN: Si familia robles, tiene a barrientos disposición servicios gratuitos de asistencia lingüística. Shanelle al [phone].    We comply with applicable federal civil rights laws and [place] laws. We do not discriminate on the basis of race, color, national origin, age, disability, sex, sexual orientation, or gender identity.            Thank you!     Thank you for choosing [facility]  for your care. Our goal is always to provide you with excellent care. Hearing back from our patients is one way we can continue to improve our services. Please take a few minutes to complete the written survey that you may receive in the mail after your visit with us. Thank you!             Your Updated Medication List - Protect others around you: Learn how to safely use, store and throw away your medicines at www.disposemymeds.org.          This list is accurate as of: 10/13/17  4:33 PM.  Always use your most recent med list.                   Brand Name Dispense Instructions for use Diagnosis    IRON SUPPLEMENT PO      Take by mouth daily (with breakfast)    Encounter for supervision of other normal pregnancy       prenatal multivitamin plus iron 27-0.8 MG Tabs per tablet      Take 1 tablet by mouth daily    Pregnancy examination or test, pregnancy unconfirmed, Recurrent pregnancy loss (CODE)

## 2017-10-26 ENCOUNTER — PRENATAL OFFICE VISIT (OUTPATIENT)
Dept: OBGYN | Facility: CLINIC | Age: 32
End: 2017-10-26
Payer: COMMERCIAL

## 2017-10-26 VITALS
TEMPERATURE: 95.8 F | OXYGEN SATURATION: 100 % | DIASTOLIC BLOOD PRESSURE: 78 MMHG | WEIGHT: 177.4 LBS | HEART RATE: 78 BPM | BODY MASS INDEX: 29.07 KG/M2 | SYSTOLIC BLOOD PRESSURE: 117 MMHG

## 2017-10-26 DIAGNOSIS — Z34.83 ENCOUNTER FOR SUPERVISION OF OTHER NORMAL PREGNANCY IN THIRD TRIMESTER: Primary | ICD-10-CM

## 2017-10-26 PROCEDURE — 99207 ZZC PRENATAL VISIT: CPT | Performed by: OBSTETRICS & GYNECOLOGY

## 2017-10-26 NOTE — PATIENT INSTRUCTIONS
If you have any questions regarding your visit, Please contact your care team.    Women s Health CLINIC HOURS TELEPHONE NUMBER   Ambers Agbeh, M.D.    Ann Maradiaga- FENG Araujo - FENG Montelongo -         Monday-[facility]  8:00 am - 5 pm  Tuesday- [facility]  8:00am- 5 pm  Wednesday- Off  Thursday- [facility]  8:00 am- 5 pm  Friday-[place]  8:00 am 5 pm [facility]  [address] [place] [address]  [phone] ask for [facility]    Imaging [phone]    [facility]  [address] [facility]  [place] [address] [phone]  Imaging [phone]     Urgent Care locations:    [facility] Saturday and Sunday   9 am - 5 pm    Monday-Friday   12 pm - 8 pm  Saturday and Sunday   9 am - 5 pm   [phone]       If you need a medication refill, please contact your pharmacy. Please allow 3 business days for your refill to be completed.  As always, Thank you for trusting us with your healthcare needs!

## 2017-10-26 NOTE — PROGRESS NOTES
32w3d.  Tired.  No HA, visual changes, N/V etc. PNE classes planned/done. RTC 2 wk/prn.    ICD-10-CM    1. Encounter for supervision of other normal pregnancy in third trimester Z34.83      CEPHAS AGBEH, MD.

## 2017-10-26 NOTE — MR AVS SNAPSHOT
After Visit Summary   10/26/2017    Roselyn Butler    MRN: [id number]           Patient Information     Date Of Birth          1985        Visit Information        Provider Department      10/26/2017 4:15 PM Agbeh, Cephas Mawuena, MD [facility]        Care Instructions                                                           If you have any questions regarding your visit, Please contact your care team.    Women s Health CLINIC HOURS TELEPHONE NUMBER   Cephas Agbeh, M.D.    Ann - JOHN Maradiaga- FENG Araujo - RN    Jayda -         Monday-[facility]  8:00 am - 5 pm  Tuesday- [facility]  8:00am- 5 pm  Wednesday- Off  Thursday- [facility]  8:00 am- 5 pm  Friday-[place]  8:00 am 5 pm [facility]  [address]  [place] [address]  [phone] ask for [facility]    Imaging [phone]    [facility]  [address] [facility]  [place] [address]  [phone]  Imaging [phone]     Urgent Care locations:    [facility] Saturday and Sunday   9 am - 5 pm    Monday-Friday   12 pm - 8 pm  Saturday and Sunday   9 am - 5 pm   [phone]       If you need a medication refill, please contact your pharmacy. Please allow 3 business days for your refill to be completed.  As always, Thank you for trusting us with your healthcare needs!                 Follow-ups after your visit        Your next 10 appointments already scheduled     Nov 09, 2017  4:30 PM CST   ESTABLISHED PRENATAL with Cephas Mawuena Agbeh, MD   [facility] [place] ([facility])    [address] [facility] [address]   [phone]            Nov 20, 2017  4:30 PM CST   ESTABLISHED PRENATAL with Cephas Mawuena Agbeh, MD   [facility] ([facility])    [address] [facility] [facility] [address]   [phone]            Feb 23, 2018  8:30 AM CST    Return Visit with Nusrat Wilson MD   [facility]    [address] [place] [address] [phone]              Who to contact     If you have questions or need follow up information about today's clinic visit or your schedule please contact [facility] [place] directly at [phone].  Normal or non-critical lab and imaging results will be communicated to you by MyChart, letter or phone within 4 business days after the clinic has received the results. If you do not hear from us within 7 days, please contact the clinic through [employer] or phone. If you have a critical or abnormal lab result, we will notify you by phone as soon as possible.  Submit refill requests through [email or web address] or call your pharmacy and they will forward the refill request to us. Please allow 3 business days for your refill to be completed.          Additional Information About Your Visit        [employer] Information     [employer] gives you secure access to your electronic health record. If you see a primary care provider, you can also send messages to your care team and make appointments. If you have questions, please call your primary care clinic.  If you do not have a primary care provider, please call [phone] and they will assist you.        Care EveryWhere ID     This is your Care EveryWhere ID. This could be used by other organizations to access your [place] medical records  [id number]        Your Vitals Were     Pulse Temperature Last Period Pulse Oximetry BMI (Body Mass Index)       78 95.8  F (35.4  C) (Tympanic) 03/12/2017 100% 29.07 kg/m2        Blood Pressure from Last 3 Encounters:   10/26/17 117/78   10/13/17 105/66   09/11/17 104/68    Weight from Last 3 Encounters:   10/26/17 177 lb 6.4 oz (80.5 kg)   10/13/17 174 lb 9.6 oz (79.2 kg)   09/11/17 172 lb (78 kg)              Today, you had the following     No orders found for display       Primary Care Provider  Office Phone # Fax #    Amber Mawuena Agbeh, [phone] [address] [facility] [place] [facility] [address]  [place] [address]        Equal Access to Services     JEFF SETHI : Lynne celaya ku lissetho Soyoniali, waaxda luqadaha, qaybta kaalmada adeegyada, vaughn coxn aurelia keane laNahidjesenia burdick. So [facility] [place] [phone].    ATENCIÓN: Si habla español, tiene a barrientos disposición servicios gratuitos de asistencia lingüística. Llame al [phone].    We comply with applicable federal civil rights laws and [place] laws. We do not discriminate on the basis of race, color, national origin, age, disability, sex, sexual orientation, or gender identity.            Thank you!     Thank you for choosing [facility]  for your care. Our goal is always to provide you with excellent care. Hearing back from our patients is one way we can continue to improve our services. Please take a few minutes to complete the written survey that you may receive in the mail after your visit with us. Thank you!             Your Updated Medication List - Protect others around you: Learn how to safely use, store and throw away your medicines at www.disposemymeds.org.          This list is accurate as of: 10/26/17  4:16 PM.  Always use your most recent med list.                   Brand Name Dispense Instructions for use Diagnosis    IRON SUPPLEMENT PO      Take by mouth daily (with breakfast)    Encounter for supervision of other normal pregnancy       prenatal multivitamin plus iron 27-0.8 MG Tabs per tablet      Take 1 tablet by mouth daily    Pregnancy examination or test, pregnancy unconfirmed, Recurrent pregnancy loss (CODE)

## 2017-10-26 NOTE — NURSING NOTE
"Chief Complaint   Patient presents with     Prenatal Care     32.3       Initial /78 (BP Location: Left arm, Patient Position: Chair, Cuff Size: Adult Regular)  Pulse 78  Temp 95.8  F (35.4  C) (Tympanic)  Wt 177 lb 6.4 oz (80.5 kg)  LMP 03/12/2017  SpO2 100%  BMI 29.07 kg/m2 Estimated body mass index is 29.07 kg/(m^2) as calculated from the following:    Height as of 10/13/17: 5' 5.5\" (1.664 m).    Weight as of this encounter: 177 lb 6.4 oz (80.5 kg).  Medication Reconciliation: complete   Ann Penaloza LPN    "

## 2017-11-06 ENCOUNTER — MYC MEDICAL ADVICE (OUTPATIENT)
Dept: OBGYN | Facility: CLINIC | Age: 32
End: 2017-11-06

## 2017-11-09 ENCOUNTER — PRENATAL OFFICE VISIT (OUTPATIENT)
Dept: OBGYN | Facility: CLINIC | Age: 32
End: 2017-11-09
Payer: COMMERCIAL

## 2017-11-09 VITALS
TEMPERATURE: 97.6 F | HEART RATE: 69 BPM | DIASTOLIC BLOOD PRESSURE: 67 MMHG | WEIGHT: 179 LBS | SYSTOLIC BLOOD PRESSURE: 110 MMHG | BODY MASS INDEX: 29.33 KG/M2

## 2017-11-09 DIAGNOSIS — Z34.83 ENCOUNTER FOR SUPERVISION OF OTHER NORMAL PREGNANCY IN THIRD TRIMESTER: Primary | ICD-10-CM

## 2017-11-09 PROCEDURE — 99207 ZZC PRENATAL VISIT: CPT | Performed by: OBSTETRICS & GYNECOLOGY

## 2017-11-09 NOTE — MR AVS SNAPSHOT
After Visit Summary   11/9/2017    Roselyn Butler    MRN: [id number]           Patient Information     Date Of Birth          1985        Visit Information        Provider Department      11/9/2017 4:30 PM Agbeh, Cephas Mawuena, MD [facility]        Care Instructions                                                        If you have any questions regarding your visit, Please contact your care team.      Women s Health CLINIC HOURS TELEPHONE NUMBER   Cephas Agbeh, M.D. Lisa -    Ashwini-FENG Araujo-FENG Juarez[facility]JAY [facility]  8:00[facility].m-4:45 p.m  Tuesday-[facility]   8:00a.m-4:45 p.m.  [facility]  8:00a.m-4:45 p.m.  [facility]  [address] [place] [address]  [phone]    [phone]    [facility]  [address] [facility] [address]  [phone]  [phone]   Urgent Care locations:    [facility] Monday-Friday  5 pm - 9 pm  Saturday and Sunday   9 am - 5 pm    Monday-Friday   11 pm - 9 pm  Saturday and Sunday   9 am - 5 pm   [phone]     If you need a medication refill, please contact your pharmacy. Please allow 3 business days for your refill to be completed.  As always, Thank you for trusting us with your healthcare needs!              Follow-ups after your visit        Your next 10 appointments already scheduled     Nov 20, 2017  4:30 PM CST   ESTABLISHED PRENATAL with Cephas Mawuena Agbeh, MD   [facility] ([facility])    [address] [facility] [address] [phone]            Feb 23, 2018  8:30 AM CST   Return Visit with Nusrat Wilson MD   [facility]    [address] [place] [address]   [phone]              Who to contact     If you have questions or need follow up information about today's clinic visit or your  schedule please contact [facility] [place] directly at [phone].  Normal or non-critical lab and imaging results will be communicated to you by MyChart, letter or phone within 4 business days after the clinic has received the results. If you do not hear from us within 7 days, please contact the clinic through MyChart or phone. If you have a critical or abnormal lab result, we will notify you by phone as soon as possible.  Submit refill requests through FAD ? IO or call your pharmacy and they will forward the refill request to us. Please allow 3 business days for your refill to be completed.          Additional Information About Your Visit        [employer] Information     F[employer] ? [employer] gives you secure access to your electronic health record. If you see a primary care provider, you can also send messages to your care team and make appointments. If you have questions, please call your primary care clinic.  If you do not have a primary care provider, please call [phone] and they will assist you.        Care EveryWhere ID     This is your Care EveryWhere ID. This could be used by other organizations to access your [place] medical records  [id number]        Your Vitals Were     Pulse Temperature Last Period Breastfeeding? BMI (Body Mass Index)       69 97.6  F (36.4  C) (Oral) 03/12/2017 No 29.33 kg/m2        Blood Pressure from Last 3 Encounters:   11/09/17 110/67   10/26/17 117/78   10/13/17 105/66    Weight from Last 3 Encounters:   11/09/17 179 lb (81.2 kg)   10/26/17 177 lb 6.4 oz (80.5 kg)   10/13/17 174 lb 9.6 oz (79.2 kg)              Today, you had the following     No orders found for display       Primary Care Provider Office Phone # Fax #    Amber Mawuena Agbeh, [phone]       [address] [place] [address]        Equal Access to Services     JEFF SETHI : Lynne Otoole, waaxda luqadaha, qaybta dania hagen, vaughn keane  lagelacio burdick. So [facility] [phone].    ATENCIÓN: Si habla travis, tiene a barrientos disposición servicios gratuitos de asistencia lingüística. Shanelle al [phone].    We comply with applicable federal civil rights laws and [place] laws. We do not discriminate on the basis of race, color, national origin, age, disability, sex, sexual orientation, or gender identity.            Thank you!     Thank you for choosing [facility]  for your care. Our goal is always to provide you with excellent care. Hearing back from our patients is one way we can continue to improve our services. Please take a few minutes to complete the written survey that you may receive in the mail after your visit with us. Thank you!             Your Updated Medication List - Protect others around you: Learn how to safely use, store and throw away your medicines at www.disposemymeds.org.          This list is accurate as of: 11/9/17  4:39 PM.  Always use your most recent med list.                   Brand Name Dispense Instructions for use Diagnosis    IRON SUPPLEMENT PO      Take by mouth daily (with breakfast)    Encounter for supervision of other normal pregnancy       prenatal multivitamin plus iron 27-0.8 MG Tabs per tablet      Take 1 tablet by mouth daily    Pregnancy examination or test, pregnancy unconfirmed, Recurrent pregnancy loss (CODE)

## 2017-11-09 NOTE — NURSING NOTE
"Chief Complaint   Patient presents with     Prenatal Care     OBV 34w3d       Initial /67 (BP Location: Left arm, Patient Position: Chair, Cuff Size: Adult Regular)  Pulse 69  Temp 97.6  F (36.4  C) (Oral)  Wt 179 lb (81.2 kg)  LMP 03/12/2017  Breastfeeding? No  BMI 29.33 kg/m2 Estimated body mass index is 29.33 kg/(m^2) as calculated from the following:    Height as of 10/13/17: 5' 5.5\" (1.664 m).    Weight as of this encounter: 179 lb (81.2 kg).  Medication Reconciliation: complete   Alanis Garcia CMA      "

## 2017-11-09 NOTE — PROGRESS NOTES
34w3d  Tired.  No HA, visual changes, N/V etc. PNE classes planned/done. RTC 2 wk/prn.    ICD-10-CM    1. Encounter for supervision of other normal pregnancy in third trimester Z34.83      CEPHAS AGBEH, MD.

## 2017-11-09 NOTE — PATIENT INSTRUCTIONS
If you have any questions regarding your visit, Please contact your care team.      Women s Health CLINIC HOURS TELEPHONE NUMBER   Cephas Agbeh, M.D.      Jayda -    Ashwini-FENG Araujo-FENG Juarez-JOHN Monday-[place]  8:00a.m-4:45 p.m  Tuesday--[place]   8:00a.m-4:45 p.m.  Thursday-[place]  8:00a.m-4:45 p.m.  Friday-[facility]  [address] [place] [address]  [phone]    [phone]    [facility]  [address] [facility] [address] [address] [address]  [phone]  [id number]-[phone]   Urgent Care locations:    [facility] Monday-Friday  5 pm - 9 pm  Saturday and Sunday   9 am - 5 pm    Monday-Friday   11 pm - 9 pm  Saturday and Sunday   9 am - 5 pm   [phone]     If you need a medication refill, please contact your pharmacy. Please allow 3 business days for your refill to be completed.  As always, Thank you for trusting us with your healthcare needs!

## 2017-11-20 ENCOUNTER — PRENATAL OFFICE VISIT (OUTPATIENT)
Dept: OBGYN | Facility: CLINIC | Age: 32
End: 2017-11-20
Payer: COMMERCIAL

## 2017-11-20 VITALS
DIASTOLIC BLOOD PRESSURE: 69 MMHG | OXYGEN SATURATION: 98 % | SYSTOLIC BLOOD PRESSURE: 112 MMHG | HEART RATE: 64 BPM | BODY MASS INDEX: 29.43 KG/M2 | WEIGHT: 179.6 LBS | TEMPERATURE: 97.4 F

## 2017-11-20 DIAGNOSIS — Z34.83 ENCOUNTER FOR SUPERVISION OF OTHER NORMAL PREGNANCY IN THIRD TRIMESTER: Primary | ICD-10-CM

## 2017-11-20 PROCEDURE — 99207 ZZC PRENATAL VISIT: CPT | Performed by: OBSTETRICS & GYNECOLOGY

## 2017-11-20 PROCEDURE — 87653 STREP B DNA AMP PROBE: CPT | Performed by: OBSTETRICS & GYNECOLOGY

## 2017-11-20 NOTE — NURSING NOTE
"Chief Complaint   Patient presents with     Prenatal Care     36       Initial /69  Pulse 64  Temp 97.4  F (36.3  C) (Tympanic)  Wt 179 lb 9.6 oz (81.5 kg)  LMP 03/12/2017  SpO2 98%  BMI 29.43 kg/m2 Estimated body mass index is 29.43 kg/(m^2) as calculated from the following:    Height as of 10/13/17: 5' 5.5\" (1.664 m).    Weight as of this encounter: 179 lb 9.6 oz (81.5 kg).  Medication Reconciliation: complete   Ann Penaloza LPN    "

## 2017-11-20 NOTE — MR AVS SNAPSHOT
After Visit Summary   11/20/2017    Roselyn Butler    MRN: [id number]           Patient Information     Date Of Birth          1985        Visit Information        Provider Department      11/20/2017 4:30 PM Agbeh, Cephas Mawuena, MD [facility]        Today's Diagnoses     Encounter for supervision of other normal pregnancy in third trimester    -  1      Care Instructions                                                           If you have any questions regarding your visit, Please contact your care team.    Women s Health CLINIC HOURS TELEPHONE NUMBER   Cephas Agbeh, M.D.    Ann Maradiaga- FENG Araujo - RN    Jayda -         Monday-[facility]  8:00 am - 5 pm  Tuesday- [facility]  8:00am- 5 pm  Wednesday- Off  Thursday- [facility]  8:00 am- 5 pm  Friday-[place]  8:00 am 5 pm [facility]  [address] [place] [address]  [phone] ask for Women's Clinic    Imaging [phone]    [facility]  [address] [facility] [place] [address]  [phone]  Imaging [phone]     Urgent Care locations:    [facility] Saturday and Sunday   9 am - 5 pm    Monday-Friday   12 pm - 8 pm  Saturday and Sunday   9 am - 5 pm   [phone]       If you need a medication refill, please contact your pharmacy. Please allow 3 business days for your refill to be completed.  As always, Thank you for trusting us with your healthcare needs!                 Follow-ups after your visit        Your next 10 appointments already scheduled     Dec 01, 2017  4:45 PM CST   ESTABLISHED PRENATAL with Cephas Mawuena Agbeh, MD   [facility] ([facility])    [address] [facility] [id number] [phone]            Dec 08, 2017  3:45 PM CST   ESTABLISHED PRENATAL with Cephas Mawuena Agbeh, MD   [facility] [place] ([facility])     [address] [facility] [place] [id number] [phone]            Feb 23, 2018  8:30 AM CST   Return Visit with Nusrat Wilson MD   [facility]    [address] [place] [address] [phone]              Who to contact     If you have questions or need follow up information about today's clinic visit or your schedule please contact [facility] directly at [phone].  Normal or non-critical lab and imaging results will be communicated to you by Projjixhart, letter or phone within 4 business days after the clinic has received the results. If you do not hear from us within 7 days, please contact the clinic through [email or web address] or phone. If you have a critical or abnormal lab result, we will notify you by phone as soon as possible.  Submit refill requests through Tapomat or call your pharmacy and they will forward the refill request to us. Please allow 3 business days for your refill to be completed.          Additional Information About Your Visit        MyChart Information     Tapomat gives you secure access to your electronic health record. If you see a primary care provider, you can also send messages to your care team and make appointments. If you have questions, please call your primary care clinic.  If you do not have a primary care provider, please call [phone] and they will assist you.        Care EveryWhere ID     This is your Care EveryWhere ID. This could be used by other organizations to access your [facility] medical records  [id number]        Your Vitals Were     Pulse Temperature Last Period Pulse Oximetry BMI (Body Mass Index)       64 97.4  F (36.3  C) (Tympanic) 03/12/2017 98% 29.43 kg/m2        Blood Pressure from Last 3 Encounters:   11/20/17 112/69   11/09/17 110/67   10/26/17 117/78    Weight from Last 3 Encounters:   11/20/17 179 lb 9.6 oz (81.5 kg)   11/09/17 179 lb (81.2 kg)   10/26/17 177 lb 6.4 oz (80.5 kg)               We Performed the Following     Group B strep PCR        Primary Care Provider Office Phone # Fax #    Amber Mawuena Agbeh, [phone]       [address]  [place] [address]        Equal Access to Services     [facility]: Hadii aad ku hadasho Soomaali, waaxda luqadaha, qaybta kaalmada adeegyada, waxay florianin hayaan adesacha meeranathaly gonzalezrustycarri . So [facility] [phone].    ATENCIÓN: Si habla español, tiene a barrientos disposición servicios gratuitos de asistencia lingüística. Llame al [phone].    We comply with applicable federal civil rights laws and [place] laws. We do not discriminate on the basis of race, color, national origin, age, disability, sex, sexual orientation, or gender identity.            Thank you!     Thank you for choosing [facility]  for your care. Our goal is always to provide you with excellent care. Hearing back from our patients is one way we can continue to improve our services. Please take a few minutes to complete the written survey that you may receive in the mail after your visit with us. Thank you!             Your Updated Medication List - Protect others around you: Learn how to safely use, store and throw away your medicines at www.disposemymeds.org.          This list is accurate as of: 11/20/17  4:36 PM.  Always use your most recent med list.                   Brand Name Dispense Instructions for use Diagnosis    IRON SUPPLEMENT PO      Take by mouth daily (with breakfast)    Encounter for supervision of other normal pregnancy       prenatal multivitamin plus iron 27-0.8 MG Tabs per tablet      Take 1 tablet by mouth daily    Pregnancy examination or test, pregnancy unconfirmed, Recurrent pregnancy loss (CODE)

## 2017-11-20 NOTE — PATIENT INSTRUCTIONS
If you have any questions regarding your visit, Please contact your care team.    Women s Health CLINIC HOURS TELEPHONE NUMBER   Ambers Agbeh, M.D.    Ann Maradiaga- FENG Araujo - FENG Montelongo -         Monday-[facility]  8:00 am - 5 pm  Tuesday- [facility]  8:00am- 5 pm  Wednesday- Off  Thursday- [facility]  8:00 am- 5 pm  Friday-[place]  8:00 am 5 pm [facility]  [address] [place] [address]  [phone] ask for [facility]    Imaging [phone]    [facility]  [address] [facility]  [place] [address] [phone]  Imaging [phone]     Urgent Care locations:    [facility] Saturday and Sunday   9 am - 5 pm    Monday-Friday   12 pm - 8 pm  Saturday and Sunday   9 am - 5 pm   [phone]       If you need a medication refill, please contact your pharmacy. Please allow 3 business days for your refill to be completed.  As always, Thank you for trusting us with your healthcare needs!

## 2017-11-20 NOTE — PROGRESS NOTES
36w0d  No HA, visual changes, N/V etc.  Labor plan and warning s/s discussed. Routine AG. See flowsheet. RTC 1 wk/prn.     ICD-10-CM    1. Encounter for supervision of other normal pregnancy in third trimester Z34.83 Group B strep PCR     CEPHAS AGBEH, MD.

## 2017-11-22 LAB
GP B STREP DNA SPEC QL NAA+PROBE: NEGATIVE
SPECIMEN SOURCE: NORMAL

## 2017-12-01 ENCOUNTER — PRENATAL OFFICE VISIT (OUTPATIENT)
Dept: OBGYN | Facility: CLINIC | Age: 32
End: 2017-12-01
Payer: COMMERCIAL

## 2017-12-01 VITALS
HEART RATE: 78 BPM | DIASTOLIC BLOOD PRESSURE: 83 MMHG | OXYGEN SATURATION: 100 % | TEMPERATURE: 97.6 F | WEIGHT: 181.6 LBS | BODY MASS INDEX: 29.76 KG/M2 | SYSTOLIC BLOOD PRESSURE: 132 MMHG

## 2017-12-01 DIAGNOSIS — Z34.83 ENCOUNTER FOR SUPERVISION OF OTHER NORMAL PREGNANCY IN THIRD TRIMESTER: Primary | ICD-10-CM

## 2017-12-01 PROCEDURE — 99207 ZZC PRENATAL VISIT: CPT | Performed by: OBSTETRICS & GYNECOLOGY

## 2017-12-01 NOTE — NURSING NOTE
"Chief Complaint   Patient presents with     Prenatal Care     37.4       Initial /83  Pulse 78  Temp 97.6  F (36.4  C) (Tympanic)  Wt 181 lb 9.6 oz (82.4 kg)  LMP 03/12/2017  SpO2 100%  BMI 29.76 kg/m2 Estimated body mass index is 29.76 kg/(m^2) as calculated from the following:    Height as of 10/13/17: 5' 5.5\" (1.664 m).    Weight as of this encounter: 181 lb 9.6 oz (82.4 kg).  Medication Reconciliation: complete   Ann Penaloza LPN    "

## 2017-12-01 NOTE — PROGRESS NOTES
37w4d  Tired.  No HA, visual changes, N/V etc.  Labor plan and warning s/s discussed. RTC 1 wk/prn.  GBS is negative.    ICD-10-CM    1. Encounter for supervision of other normal pregnancy in third trimester Z34.83      CEPHAS AGBEH, MD.

## 2017-12-01 NOTE — PATIENT INSTRUCTIONS
If you have any questions regarding your visit, Please contact your care team.    Women s Health CLINIC HOURS TELEPHONE NUMBER   Ambers Agbeh, M.D.    Ann Maradiaga- FENG Araujo - FENG Montelongo -         Monday-[facility]  8:00 am - 5 pm  Tuesday- [facility]  8:00am- 5 pm  Wednesday- Off  Thursday- [facility]  8:00 am- 5 pm  Friday-[place]  8:00 am 5 pm [facility]  [address] [place] [address]  [phone] ask for [facility]    Imaging [phone]    [facility]  [address] [facility]  [place] [address] [phone]  Imaging [phone]     Urgent Care locations:    [facility] Saturday and Sunday   9 am - 5 pm    Monday-Friday   12 pm - 8 pm  Saturday and Sunday   9 am - 5 pm   [phone]       If you need a medication refill, please contact your pharmacy. Please allow 3 business days for your refill to be completed.  As always, Thank you for trusting us with your healthcare needs!

## 2017-12-01 NOTE — MR AVS SNAPSHOT
After Visit Summary   12/1/2017    Roselyn Butler    MRN: [id number]           Patient Information     Date Of Birth          1985        Visit Information        Provider Department      12/1/2017 4:45 PM Agbeh, Cephas Mawuena, MD [facility]        Care Instructions                                                           If you have any questions regarding your visit, Please contact your care team.    Women s Health CLINIC HOURS TELEPHONE NUMBER   Cephas Agbeh, M.D.    Ann - JOHN Maradiaga- FENG Araujo - RN    Jayda -         Monday-[facility]  8:00 am - 5 pm  Tuesday- [facility]  8:00am- 5 pm  Wednesday- Off  Thursday- [facility]  8:00 am- 5 pm  Friday-[place]  8:00 am 5 pm [facility]  [address] [place] [address]  [phone] ask for Women's [facility]    Imaging [phone]    [facility]  [address] [facility]  [place] [address]  [phone]  Imaging [phone]     Urgent Care locations:    [facility] Saturday and Sunday   9 am - 5 pm    Monday-Friday   12 pm - 8 pm  Saturday and Sunday   9 am - 5 pm   [phone]       If you need a medication refill, please contact your pharmacy. Please allow 3 business days for your refill to be completed.  As always, Thank you for trusting us with your healthcare needs!                 Follow-ups after your visit        Your next 10 appointments already scheduled     Dec 01, 2017  4:45 PM CST   ESTABLISHED PRENATAL with Cephas Mawuena Agbeh, MD   [facility] [place] ([facility])    [address] [facility] [address]   [phone]            Dec 08, 2017  3:45 PM CST   ESTABLISHED PRENATAL with Cephas Mawuena Agbeh, MD   [facility]    [address] [facility] [address]   [phone]            Feb 23, 2018  8:30 AM CST    Return Visit with Nusrat Wilson MD   [facility]    [address] [place] [address] [phone]              Who to contact     If you have questions or need follow up information about today's clinic visit or your schedule please contact [facility] [place] directly at [phone].  Normal or non-critical lab and imaging results will be communicated to you by MyChart, letter or phone within 4 business days after the clinic has received the results. If you do not hear from us within 7 days, please contact the clinic through Feastiehart or phone. If you have a critical or abnormal lab result, we will notify you by phone as soon as possible.  Submit refill requests through Franchisee Gladiator or call your pharmacy and they will forward the refill request to us. Please allow 3 business days for your refill to be completed.          Additional Information About Your Visit        Feastiehart Information     Franchisee Gladiator gives you secure access to your electronic health record. If you see a primary care provider, you can also send messages to your care team and make appointments. If you have questions, please call your primary care clinic.  If you do not have a primary care provider, please call [phone] and they will assist you.        Care EveryWhere ID     This is your Care EveryWhere ID. This could be used by other organizations to access your [facility] medical records  [id number]        Your Vitals Were     Pulse Temperature Last Period Pulse Oximetry BMI (Body Mass Index)       78 97.6  F (36.4  C) (Tympanic) 03/12/2017 100% 29.76 kg/m2        Blood Pressure from Last 3 Encounters:   12/01/17 132/83   11/20/17 112/69   11/09/17 110/67    Weight from Last 3 Encounters:   12/01/17 181 lb 9.6 oz (82.4 kg)   11/20/17 179 lb 9.6 oz (81.5 kg)   11/09/17 179 lb (81.2 kg)              Today, you had the following     No orders found for display       Primary Care Provider  Office Phone # Fax #    Amber Mawuena Agbeh, [phone] [address] [facility] [address]  [place] [address]        Equal Access to Services     JE[facility] SETHI : Lynne eclaya ku lissetho Soyoniali, waaxda luqadaha, qaybta kaalmada adeegyada, vaughn coxn aurelia keane laNahidjesenia burdick. So [facility] [phone].    ATENCIÓN: Si habla español, tiene a barrientos disposición servicios gratuitos de asistencia lingüística. Llame al [phone].    We comply with applicable federal civil rights laws and [place] laws. We do not discriminate on the basis of race, color, national origin, age, disability, sex, sexual orientation, or gender identity.            Thank you!     Thank you for choosing [facility]  for your care. Our goal is always to provide you with excellent care. Hearing back from our patients is one way we can continue to improve our services. Please take a few minutes to complete the written survey that you may receive in the mail after your visit with us. Thank you!             Your Updated Medication List - Protect others around you: Learn how to safely use, store and throw away your medicines at www.disposemymeds.org.          This list is accurate as of: 12/1/17  4:42 PM.  Always use your most recent med list.                   Brand Name Dispense Instructions for use Diagnosis    IRON SUPPLEMENT PO      Take by mouth daily (with breakfast)    Encounter for supervision of other normal pregnancy       prenatal multivitamin plus iron 27-0.8 MG Tabs per tablet      Take 1 tablet by mouth daily    Pregnancy examination or test, pregnancy unconfirmed, Recurrent pregnancy loss (CODE)

## 2017-12-05 ENCOUNTER — PRENATAL OFFICE VISIT (OUTPATIENT)
Dept: OBGYN | Facility: CLINIC | Age: 32
End: 2017-12-05
Payer: COMMERCIAL

## 2017-12-05 ENCOUNTER — NURSE TRIAGE (OUTPATIENT)
Dept: NURSING | Facility: CLINIC | Age: 32
End: 2017-12-05

## 2017-12-05 ENCOUNTER — TELEPHONE (OUTPATIENT)
Dept: OBGYN | Facility: CLINIC | Age: 32
End: 2017-12-05

## 2017-12-05 VITALS
WEIGHT: 181.9 LBS | SYSTOLIC BLOOD PRESSURE: 114 MMHG | TEMPERATURE: 97.4 F | OXYGEN SATURATION: 98 % | HEART RATE: 75 BPM | BODY MASS INDEX: 29.81 KG/M2 | DIASTOLIC BLOOD PRESSURE: 75 MMHG

## 2017-12-05 DIAGNOSIS — Z34.83 ENCOUNTER FOR SUPERVISION OF OTHER NORMAL PREGNANCY IN THIRD TRIMESTER: Primary | ICD-10-CM

## 2017-12-05 PROCEDURE — 99207 ZZC PRENATAL VISIT: CPT | Performed by: OBSTETRICS & GYNECOLOGY

## 2017-12-05 NOTE — TELEPHONE ENCOUNTER
JUNIOR MONTANA MD [[id number]]  ULYSSES MOLINA [[id number]] 05-Dec-2017 01:22 PM [phone] PAGE SENT messageID: [id number], CAll [phone] Roselyn Butler MRN: [id number] 1985 -RICHA 12/17/17 followed by Agbeh . @12noon had 15 minutes severe vertigo episode but relieved with eat and rest . FNA Pat     Oncall Assignments for         No Assignments found.               Compose A Message      Enter the Id to send message:                                              Smart web paged JUNIOR Huber MD @122pm( covering for Dr Agbeh )  to call Pt @ [phone] Re: RICHA 12/17/17 and has severe vertigo spell for 15minutes at 12noon but now is relieved after rest and eating .    Hx of this a previous pregnancy .   .Marsha Molina RN [place] nurse advisors.        Reason for Disposition    [1] MODERATE dizziness (e.g., vertigo; feels very unsteady, interferes with normal activities) AND [2] has NOT been evaluated by physician for this    Additional Information    Negative: [1] Weakness (i.e., paralysis, loss of muscle strength) of the face, arm or leg on one side of the body AND [2] sudden onset AND [3] present now    Negative: [1] Numbness (i.e., loss of sensation) of the face, arm or leg on one side of the body AND [2] sudden onset AND [3] present now    Negative: [1] Loss of speech or garbled speech AND [2] sudden onset AND [3] present now    Negative: Difficult to awaken or acting confused  (e.g., disoriented, slurred speech)    Negative: Sounds like a life-threatening emergency to the triager    Negative: Followed a head injury    Negative: Followed an ear injury    Negative: Localized weakness or numbness is main symptom    Negative: Dizziness relates to riding in a car, going to an amusement park, etc.    Negative: [1] Dizziness is main symptom AND [2] NO spinning sensation (i.e., vertigo)    Negative: SEVERE dizziness (vertigo) (e.g., unable to walk without assistance)    Negative: [1] Dizziness (vertigo)  present now AND [2] one or more stroke risk factors (i.e., hypertension, diabetes, prior stroke/TIA/heart attack)  (Exception: prior physician evaluation for this AND no different/worse than usual)    Negative: [1] Dizziness (vertigo) present now AND [2] age > 60  (Exception: prior physician evaluation for this AND no different/worse than usual)    Negative: Severe headache (e.g., excruciating)  (Exception: similar to previous migraines)    Negative: Patient sounds very sick or weak to the triager    Negative: Taking a medicine that could cause dizziness (e.g., phenytoin [Dilantin], carbamazepine [Tegretol], primidone [Mysoline])    Protocols used: DIZZINESS - VERTIGO-ADULT-

## 2017-12-05 NOTE — PROGRESS NOTES
38w1d  Has cold symptoms. Felt dizzy at work. HEENT is normal. May use OTC meds.  Labor plan and warning s/s discussed. RTC 1 wk/prn.    ICD-10-CM    1. Encounter for supervision of other normal pregnancy in third trimester Z34.83      CEPHAS AGBEH, MD.

## 2017-12-05 NOTE — TELEPHONE ENCOUNTER
Return call to patient to schedule.  Patient states she has already been called back & scheduled (did not see any notes of this).

## 2017-12-05 NOTE — NURSING NOTE
"Chief Complaint   Patient presents with     Prenatal Care     38w1d       Initial /75  Pulse 75  Temp 97.4  F (36.3  C) (Oral)  Wt 82.5 kg (181 lb 14.4 oz)  LMP 03/12/2017  SpO2 98%  BMI 29.81 kg/m2 Estimated body mass index is 29.81 kg/(m^2) as calculated from the following:    Height as of 10/13/17: 1.664 m (5' 5.5\").    Weight as of this encounter: 82.5 kg (181 lb 14.4 oz).  Medication Reconciliation: complete   Cherrie Lipscomb, CMA      "

## 2017-12-05 NOTE — MR AVS SNAPSHOT
After Visit Summary   12/5/2017    Roselyn Butler    MRN: [id number]           Patient Information     Date Of Birth          1985        Visit Information        Provider Department      12/5/2017 2:15 PM Agbeh, Cephas Mawuena, MD [facility]        Care Instructions                                                           If you have any questions regarding your visit, Please contact your care team.    [facility] CLINIC HOURS TELEPHONE NUMBER   Cephas Agbeh, M.D. Kristen - JOHN Maradiaga- FENG Araujo - FENG Montelongo -         Monday-[facility]  8:00 am - 5 pm  Tuesday- [facility]  8:00am- 5 pm  Wednesday- Off  Thursday- [facility]  8:00 am- 5 pm  Friday-[place]  8:00 am 5 pm [facility]  [address]  [place], [place] [address] [phone] ask for [facility]    Imaging [phone]    [facility]  [address] [place], [place] [address] [phone]  Imaging [phone]     Urgent Care locations:    [facility] Saturday and Sunday   9 am - 5 pm    Monday-Friday   12 pm - 8 pm  Saturday and Sunday   9 am - 5 pm   [phone]       If you need a medication refill, please contact your pharmacy. Please allow 3 business days for your refill to be completed.  As always, Thank you for trusting us with your healthcare needs!                  Follow-ups after your visit        Your next 10 appointments already scheduled     Dec 15, 2017  4:15 PM CST   ESTABLISHED PRENATAL with Cephas Mawuena Agbeh, MD   [facility] ([facility])    [address] [facility] [id number] [phone]            Feb 23, 2018  8:30 AM CST   Return Visit with Nusrat Wilson MD   [facility]    [address] [facility] [id number] [phone]              Who to contact     If you have  questions or need follow up information about today's clinic visit or your schedule please contact [facility] directly at [phone].  Normal or non-critical lab and imaging results will be communicated to you by MyChart, letter or phone within 4 business days after the clinic has received the results. If you do not hear from us within 7 days, please contact the clinic through Tradeshifthart or phone. If you have a critical or abnormal lab result, we will notify you by phone as soon as possible.  Submit refill requests through Solaire Generation or call your pharmacy and they will forward the refill request to us. Please allow 3 business days for your refill to be completed.          Additional Information About Your Visit        [email or web address] Information     [employer] gives you secure access to your electronic health record. If you see a primary care provider, you can also send messages to your care team and make appointments. If you have questions, please call your primary care clinic.  If you do not have a primary care provider, please call [phone] and they will assist you.        Care EveryWhere ID     This is your Care EveryWhere ID. This could be used by other organizations to access your [facility] medical records  [id number]        Your Vitals Were     Pulse Temperature Last Period Pulse Oximetry BMI (Body Mass Index)       75 97.4  F (36.3  C) (Oral) 03/12/2017 98% 29.81 kg/m2        Blood Pressure from Last 3 Encounters:   12/05/17 114/75   12/01/17 132/83   11/20/17 112/69    Weight from Last 3 Encounters:   12/05/17 82.5 kg (181 lb 14.4 oz)   12/01/17 82.4 kg (181 lb 9.6 oz)   11/20/17 81.5 kg (179 lb 9.6 oz)              Today, you had the following     No orders found for display       Primary Care Provider Office Phone # Fax #    Amber Mawuena Agbeh, [phone]       [address] [place] [address]        Equal Access to Services     JEFF SETHI AH: Lynne neff  Xiang, eddieda lugigiadaha, qavanita karomelia hagen, vaughn florianin hayaan aronsacha pierrelinda laNahidjesenia heavenly. So [facility] [phone].    ATENCIÓN: Si familia robles, tiene a barrientos disposición servicios gratuitos de asistencia lingüística. Shanelle al [phone].    We comply with applicable federal civil rights laws and [place] laws. We do not discriminate on the basis of race, color, national origin, age, disability, sex, sexual orientation, or gender identity.            Thank you!     Thank you for choosing [facility]  for your care. Our goal is always to provide you with excellent care. Hearing back from our patients is one way we can continue to improve our services. Please take a few minutes to complete the written survey that you may receive in the mail after your visit with us. Thank you!             Your Updated Medication List - Protect others around you: Learn how to safely use, store and throw away your medicines at www.disposemymeds.org.          This list is accurate as of: 12/5/17  2:36 PM.  Always use your most recent med list.                   Brand Name Dispense Instructions for use Diagnosis    IRON SUPPLEMENT PO      Take by mouth daily (with breakfast)    Encounter for supervision of other normal pregnancy       prenatal multivitamin plus iron 27-0.8 MG Tabs per tablet      Take 1 tablet by mouth daily    Pregnancy examination or test, pregnancy unconfirmed, Recurrent pregnancy loss (CODE)

## 2017-12-05 NOTE — PATIENT INSTRUCTIONS
If you have any questions regarding your visit, Please contact your care team.    [facility] CLINIC HOURS TELEPHONE NUMBER   Ambers Agbeh, M.D.    Ann Maradiaga- FENG Araujo - FENG Montelongo -         Monday-[facility]  8:00 am - 5 pm  Tuesday- [facility]  8:00am- 5 pm  Wednesday- Off  Thursday- [facility]  8:00 am- 5 pm  Friday-[place]  8:00 am 5 pm [facility]  [address] [place] [address]  [phone] ask for [facility]    Imaging [phone]    [facility]  [address]  [place] [address] [phone]  Imaging [phone]     Urgent Care locations:    [facility] Saturday and Sunday   9 am - 5 pm    Monday-Friday   12 pm - 8 pm  Saturday and Sunday   9 am - 5 pm   [phone]       If you need a medication refill, please contact your pharmacy. Please allow 3 business days for your refill to be completed.  As always, Thank you for trusting us with your healthcare needs!

## 2017-12-05 NOTE — TELEPHONE ENCOUNTER
[facility] Call Center    Phone Message    Name of Caller: Roselyn    Phone Number: Home number on file [phone] (home)    Best time to return call: Any    May a detailed message be left on voicemail: yes    Relation to patient: Self    Reason for Call: Other: Patient spoke with Dr. Tam this morning and was advised to come in and follow up with Dr. Agbeh today. She is 38 weeks pregnant and is having a lot of dizzy spells. Please advise.      Action Taken: Message routed to:  [facility] p [phone]

## 2017-12-07 ENCOUNTER — TELEPHONE (OUTPATIENT)
Dept: OBGYN | Facility: CLINIC | Age: 32
End: 2017-12-07

## 2017-12-07 NOTE — TELEPHONE ENCOUNTER
Roselyn Butler is a 32 year old female who calls with dizziness.    NURSING ASSESSMENT:  Description: 2 dizzy spells    Onset/duration:  Happened once Tuesday-lasted 15 minuted and again today-lasted 10 minutes.   Precip. factors:  Hadn't eaten anything on Tuesday before it happened. Had eaten about one hour prior to the dizzy spell today.  Associated symptoms: Also has a cold currently. Runny nose and mild cough. Denied fever, weakness, fatigue, falls, shortness of breath, headache, pale clammy skin, trauma, drooping face, ringing in ears.  Improves/worsens symptoms:  Laying down helps  Pain scale (0-10)   0/10  Allergies: No Known Allergies    MEDICATIONS:   Taking over the counter medication(s?) Took acetaminophen this morning after the dizzy spell.   Any medication side effects? No significant side effects    Any barriers to taking medication(s) as prescribed?  No  Medication(s) improving/managing symptoms?  Yes      NURSING PLAN: Nursing advice to patient schedule appointment, continue to monitor    RECOMMENDED DISPOSITION:  See in 24 hours - appointment scheduled    Next 5 appointments (look out 90 days)     Dec 08, 2017  3:45 PM CST   ESTABLISHED PRENATAL with Cephas Mawuena Agbeh, MD   [facility]    [address] [facility] [address]   [phone]            Dec 15, 2017  4:15 PM CST   ESTABLISHED PRENATAL with Cephas Mawuena Agbeh, MD   [facility])    [address] [facility] [address]   [phone]            Feb 23, 2018  8:30 AM CST   Return Visit with Nusrat Wilson MD   [facility]    [address] [place] [id number] [phone]                  Will comply with recommendation: Yes  If further questions/concerns or if symptoms do not improve, worsen or new symptoms develop, call your PCP or [facility] as soon as  possible.      Guideline used:  Telephone Triage Protocols for Nurses, Fifth Edition, Gisele Jeffries RN, BSN

## 2017-12-08 ENCOUNTER — PRENATAL OFFICE VISIT (OUTPATIENT)
Dept: OBGYN | Facility: CLINIC | Age: 32
End: 2017-12-08
Payer: COMMERCIAL

## 2017-12-08 VITALS
SYSTOLIC BLOOD PRESSURE: 133 MMHG | DIASTOLIC BLOOD PRESSURE: 86 MMHG | OXYGEN SATURATION: 100 % | TEMPERATURE: 98.4 F | BODY MASS INDEX: 30.09 KG/M2 | HEART RATE: 73 BPM | WEIGHT: 183.6 LBS

## 2017-12-08 DIAGNOSIS — R05.9 COUGH: ICD-10-CM

## 2017-12-08 DIAGNOSIS — J01.00 ACUTE NON-RECURRENT MAXILLARY SINUSITIS: ICD-10-CM

## 2017-12-08 DIAGNOSIS — R42 DIZZINESS: ICD-10-CM

## 2017-12-08 DIAGNOSIS — Z34.83 ENCOUNTER FOR SUPERVISION OF OTHER NORMAL PREGNANCY IN THIRD TRIMESTER: Primary | ICD-10-CM

## 2017-12-08 LAB
ALBUMIN SERPL-MCNC: 2.7 G/DL (ref 3.4–5)
ALP SERPL-CCNC: 115 U/L (ref 40–150)
ALT SERPL W P-5'-P-CCNC: 16 U/L (ref 0–50)
ANION GAP SERPL CALCULATED.3IONS-SCNC: 8 MMOL/L (ref 3–14)
AST SERPL W P-5'-P-CCNC: 14 U/L (ref 0–45)
BASOPHILS # BLD AUTO: 0 10E9/L (ref 0–0.2)
BASOPHILS NFR BLD AUTO: 0.2 %
BILIRUB SERPL-MCNC: 0.3 MG/DL (ref 0.2–1.3)
BUN SERPL-MCNC: 8 MG/DL (ref 7–30)
CALCIUM SERPL-MCNC: 8.3 MG/DL (ref 8.5–10.1)
CHLORIDE SERPL-SCNC: 108 MMOL/L (ref 94–109)
CO2 SERPL-SCNC: 24 MMOL/L (ref 20–32)
CREAT SERPL-MCNC: 0.69 MG/DL (ref 0.52–1.04)
CREAT UR-MCNC: 119 MG/DL
DIFFERENTIAL METHOD BLD: ABNORMAL
EOSINOPHIL # BLD AUTO: 0 10E9/L (ref 0–0.7)
EOSINOPHIL NFR BLD AUTO: 0.3 %
ERYTHROCYTE [DISTWIDTH] IN BLOOD BY AUTOMATED COUNT: 13.1 % (ref 10–15)
GFR SERPL CREATININE-BSD FRML MDRD: >90 ML/MIN/1.7M2
GLUCOSE SERPL-MCNC: 105 MG/DL (ref 70–99)
HCT VFR BLD AUTO: 35 % (ref 35–47)
HGB BLD-MCNC: 11.8 G/DL (ref 11.7–15.7)
LYMPHOCYTES # BLD AUTO: 2.2 10E9/L (ref 0.8–5.3)
LYMPHOCYTES NFR BLD AUTO: 26 %
MCH RBC QN AUTO: 33.1 PG (ref 26.5–33)
MCHC RBC AUTO-ENTMCNC: 33.7 G/DL (ref 31.5–36.5)
MCV RBC AUTO: 98 FL (ref 78–100)
MONOCYTES # BLD AUTO: 0.7 10E9/L (ref 0–1.3)
MONOCYTES NFR BLD AUTO: 8.3 %
NEUTROPHILS # BLD AUTO: 5.6 10E9/L (ref 1.6–8.3)
NEUTROPHILS NFR BLD AUTO: 65.2 %
PLATELET # BLD AUTO: 163 10E9/L (ref 150–450)
POTASSIUM SERPL-SCNC: 3.4 MMOL/L (ref 3.4–5.3)
PROT SERPL-MCNC: 6.3 G/DL (ref 6.8–8.8)
PROT UR-MCNC: 0.18 G/L
PROT/CREAT 24H UR: 0.15 G/G CR (ref 0–0.2)
RBC # BLD AUTO: 3.57 10E12/L (ref 3.8–5.2)
SODIUM SERPL-SCNC: 140 MMOL/L (ref 133–144)
WBC # BLD AUTO: 8.6 10E9/L (ref 4–11)

## 2017-12-08 PROCEDURE — 99207 ZZC PRENATAL VISIT: CPT | Performed by: OBSTETRICS & GYNECOLOGY

## 2017-12-08 PROCEDURE — 80053 COMPREHEN METABOLIC PANEL: CPT | Performed by: OBSTETRICS & GYNECOLOGY

## 2017-12-08 PROCEDURE — 84156 ASSAY OF PROTEIN URINE: CPT | Performed by: OBSTETRICS & GYNECOLOGY

## 2017-12-08 PROCEDURE — 36415 COLL VENOUS BLD VENIPUNCTURE: CPT | Performed by: OBSTETRICS & GYNECOLOGY

## 2017-12-08 PROCEDURE — 85025 COMPLETE CBC W/AUTO DIFF WBC: CPT | Performed by: OBSTETRICS & GYNECOLOGY

## 2017-12-08 RX ORDER — AZITHROMYCIN 250 MG/1
TABLET, FILM COATED ORAL
Qty: 6 TABLET | Refills: 0 | Status: SHIPPED | OUTPATIENT
Start: 2017-12-08 | End: 2018-02-01

## 2017-12-08 NOTE — MR AVS SNAPSHOT
After Visit Summary   12/8/2017    Roselyn Butler    MRN: [id number]           Patient Information     Date Of Birth          1985        Visit Information        Provider Department      12/8/2017 3:45 PM Agbeh, Cephas Mawuena, MD [facility]        Today's Diagnoses     Encounter for supervision of other normal pregnancy in third trimester    -  1    Acute non-recurrent maxillary sinusitis        Dizziness        Cough          Care Instructions                                                           If you have any questions regarding your visit, Please contact your care team.    [facility] CLINIC HOURS TELEPHONE NUMBER   Cephas Agbeh, M.D.    Ann Araujo - RN    Jayda -         Monday-[facility]  8:00 am - 5 pm  Tuesday- [facility]  8:00am- 5 pm  Wednesday- Off  Thursday- [facility]  8:00 am- 5 pm  Friday-[place]  8:00 am 5 pm [facility]  [address] [place] [address]  [phone] ask for [facility]    Imaging [phone]    [facility]  [address] [facility]  [place] [address]  [phone]  Imaging [phone]     Urgent Care locations:    [facility] Saturday and Sunday   9 am - 5 pm    Monday-Friday   12 pm - 8 pm  Saturday and Sunday   9 am - 5 pm   [phone]       If you need a medication refill, please contact your pharmacy. Please allow 3 business days for your refill to be completed.  As always, Thank you for trusting us with your healthcare needs!                 Follow-ups after your visit        Your next 10 appointments already scheduled     Dec 15, 2017  4:15 PM CST   ESTABLISHED PRENATAL with Cephas Mawuena Agbeh, MD   [facility] ([facility])    [address] [address]   [phone]            Feb 23, 2018  8:30 AM CST   Return Visit with Nusrat  MD Steve   [facility] ([facility]    [address] [place] [address] [phone]              Who to contact     If you have questions or need follow up information about today's clinic visit or your schedule please contact [facility] directly at [phone].  Normal or non-critical lab and imaging results will be communicated to you by MyChart, letter or phone within 4 business days after the clinic has received the results. If you do not hear from us within 7 days, please contact the clinic through Presshart or phone. If you have a critical or abnormal lab result, we will notify you by phone as soon as possible.  Submit refill requests through Shmoop or call your pharmacy and they will forward the refill request to us. Please allow 3 business days for your refill to be completed.          Additional Information About Your Visit        Presshart Information     Shmoop gives you secure access to your electronic health record. If you see a primary care provider, you can also send messages to your care team and make appointments. If you have questions, please call your primary care clinic.  If you do not have a primary care provider, please call [phone] and they will assist you.        Care EveryWhere ID     This is your Care EveryWhere ID. This could be used by other organizations to access your [facility] medical records  [id number]        Your Vitals Were     Pulse Temperature Last Period Pulse Oximetry BMI (Body Mass Index)       73 98.4  F (36.9  C) (Tympanic) 03/12/2017 100% 30.09 kg/m2        Blood Pressure from Last 3 Encounters:   12/08/17 133/86   12/05/17 114/75   12/01/17 132/83    Weight from Last 3 Encounters:   12/08/17 183 lb 9.6 oz (83.3 kg)   12/05/17 181 lb 14.4 oz (82.5 kg)   12/01/17 181 lb 9.6 oz (82.4 kg)              We Performed the Following     CBC with platelets differential     Comprehensive metabolic panel      Creatinine urine calculation only     Protein  random urine with Creat Ratio          Today's Medication Changes          These changes are accurate as of: 12/8/17  5:05 PM.  If you have any questions, ask your nurse or doctor.               Start taking these medicines.        Dose/Directions    azithromycin 250 MG tablet   Commonly known as:  ZITHROMAX   Used for:  Encounter for supervision of other normal pregnancy in third trimester, Acute non-recurrent maxillary sinusitis, Dizziness, Cough   Started by:  Agbeh, Cephas Mawuena, MD        Two tablets first day, then one tablet daily for four days.   Quantity:  6 tablet   Refills:  0            Where to get your medicines      These medications were sent to [facility] [place] - [address] [facility]  [address] [facility] [address]     Phone:  [phone]     azithromycin 250 MG tablet                Primary Care Provider Office Phone # Fax #    Cephas Mawuena Agbeh, [phone] [address] [facility]  [place] [address]        Equal Access to Services     [facility]: Hadii aad ku hadasho Soomaali, waaxda luqadaha, qaybta kaalmada adeegyada, waxay idiin hayrustyn aurelia seymour . So [facility] [phone].    ATENCIÓN: Si habla español, tiene a barrientos disposición servicios gratuitos de asistencia lingüística. Llame al [phone].    We comply with applicable federal civil rights laws and [place] laws. We do not discriminate on the basis of race, color, national origin, age, disability, sex, sexual orientation, or gender identity.            Thank you!     Thank you for choosing [facility]  for your care. Our goal is always to provide you with excellent care. Hearing back from our patients is one way we can continue to improve our services. Please take a few minutes to complete the written survey that you may receive in the mail after your visit with us. Thank you!             Your Updated  Medication List - Protect others around you: Learn how to safely use, store and throw away your medicines at www.disposemymeds.org.          This list is accurate as of: 12/8/17  5:05 PM.  Always use your most recent med list.                   Brand Name Dispense Instructions for use Diagnosis    azithromycin 250 MG tablet    ZITHROMAX    6 tablet    Two tablets first day, then one tablet daily for four days.    Encounter for supervision of other normal pregnancy in third trimester, Acute non-recurrent maxillary sinusitis, Dizziness, Cough       IRON SUPPLEMENT PO      Take by mouth daily (with breakfast)    Encounter for supervision of other normal pregnancy       prenatal multivitamin plus iron 27-0.8 MG Tabs per tablet      Take 1 tablet by mouth daily    Pregnancy examination or test, pregnancy unconfirmed, Recurrent pregnancy loss (CODE)

## 2017-12-08 NOTE — PATIENT INSTRUCTIONS
If you have any questions regarding your visit, Please contact your care team.    [facility] CLINIC HOURS TELEPHONE NUMBER   Ambers Agbeh, M.D.    [place] Maradiaga- FENG Araujo - FENG Montelongo -         Monday-[facility]  8:00 am - 5 pm  Tuesday- [facility]  8:00am- 5 pm  Wednesday- Off  Thursday- [facility]  8:00 am- 5 pm  Friday-[place]  8:00 am 5 pm [facility]  [address] [place] [address]  [phone] ask for [facility]    Imaging [phone]    [facility]  [address] [facility]  [place] [address] [phone]  Imaging [phone]     Urgent Care locations:    [facility] Saturday and Sunday   9 am - 5 pm    Monday-Friday   12 pm - 8 pm  Saturday and Sunday   9 am - 5 pm   [phone]       If you need a medication refill, please contact your pharmacy. Please allow 3 business days for your refill to be completed.  As always, Thank you for trusting us with your healthcare needs!

## 2017-12-08 NOTE — NURSING NOTE
"Chief Complaint   Patient presents with     Prenatal Care     38.4       Initial /86 (BP Location: Left arm, Patient Position: Chair, Cuff Size: Adult Large)  Pulse 73  Temp 98.4  F (36.9  C) (Tympanic)  Wt 183 lb 9.6 oz (83.3 kg)  LMP 03/12/2017  SpO2 100%  BMI 30.09 kg/m2 Estimated body mass index is 30.09 kg/(m^2) as calculated from the following:    Height as of 10/13/17: 5' 5.5\" (1.664 m).    Weight as of this encounter: 183 lb 9.6 oz (83.3 kg).  Medication Reconciliation: complete   Ann Penaloza LPN    "

## 2017-12-08 NOTE — PROGRESS NOTES
38w4d  Peridic dizziness at rest. Cough persists. Tender sinuses. HEENT is normal. Labor plan and warning s/s discussed. RTC 1 wk/prn.    Results for orders placed or performed in visit on 12/08/17   CBC with platelets differential   Result Value Ref Range    WBC 8.6 4.0 - 11.0 10e9/L    RBC Count 3.57 (L) 3.8 - 5.2 10e12/L    Hemoglobin 11.8 11.7 - 15.7 g/dL    Hematocrit 35.0 35.0 - 47.0 %    MCV 98 78 - 100 fl    MCH 33.1 (H) 26.5 - 33.0 pg    MCHC 33.7 31.5 - 36.5 g/dL    RDW 13.1 10.0 - 15.0 %    Platelet Count 163 150 - 450 10e9/L    Diff Method Automated Method     % Neutrophils 65.2 %    % Lymphocytes 26.0 %    % Monocytes 8.3 %    % Eosinophils 0.3 %    % Basophils 0.2 %    Absolute Neutrophil 5.6 1.6 - 8.3 10e9/L    Absolute Lymphocytes 2.2 0.8 - 5.3 10e9/L    Absolute Monocytes 0.7 0.0 - 1.3 10e9/L    Absolute Eosinophils 0.0 0.0 - 0.7 10e9/L    Absolute Basophils 0.0 0.0 - 0.2 10e9/L       ICD-10-CM    1. Encounter for supervision of other normal pregnancy in third trimester Z34.83 CBC with platelets differential     Comprehensive metabolic panel     Protein  random urine with Creat Ratio     Creatinine urine calculation only     azithromycin (ZITHROMAX) 250 MG tablet   2. Acute non-recurrent maxillary sinusitis J01.00 CBC with platelets differential     Comprehensive metabolic panel     Protein  random urine with Creat Ratio     Creatinine urine calculation only     azithromycin (ZITHROMAX) 250 MG tablet   3. Dizziness R42 CBC with platelets differential     Comprehensive metabolic panel     Protein  random urine with Creat Ratio     Creatinine urine calculation only     azithromycin (ZITHROMAX) 250 MG tablet   4. Cough R05 CBC with platelets differential     Comprehensive metabolic panel     Protein  random urine with Creat Ratio     Creatinine urine calculation only     azithromycin (ZITHROMAX) 250 MG tablet     Obtain labs. Abx for sinusitis.    CEPHAS AGBEH, MD.

## 2017-12-14 ENCOUNTER — TELEPHONE (OUTPATIENT)
Dept: OBGYN | Facility: CLINIC | Age: 32
End: 2017-12-14

## 2017-12-14 NOTE — TELEPHONE ENCOUNTER
"Phone call from patient. She stated she has been noticing her underwear are \"a little bit wet and it has been after using the bathroom.\" Patient stated one time her underwear a little bit wet when walking around. Patient reported 3 episodes of wetness in her underwear and happening since about noon. No contractions. 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 Advised she not wait until the end of the day and neeeds to get a sub and then go to [employer]. Patient stated her  will meet her from work as well. Explained will page and update Dr. Agbeh.   Paged and updated Dr. Agbeh.   Called and updated Nabila at [employer]. Gayle Melvin RN, BAN        "

## 2018-02-01 ENCOUNTER — PRENATAL OFFICE VISIT (OUTPATIENT)
Dept: OBGYN | Facility: CLINIC | Age: 33
End: 2018-02-01
Payer: COMMERCIAL

## 2018-02-01 VITALS
DIASTOLIC BLOOD PRESSURE: 82 MMHG | OXYGEN SATURATION: 99 % | BODY MASS INDEX: 27.56 KG/M2 | TEMPERATURE: 96.7 F | WEIGHT: 168.2 LBS | SYSTOLIC BLOOD PRESSURE: 129 MMHG | HEART RATE: 62 BPM

## 2018-02-01 PROCEDURE — 99207 ZZC POST PARTUM EXAM: CPT | Performed by: OBSTETRICS & GYNECOLOGY

## 2018-02-01 NOTE — PROGRESS NOTES
Roselyn is here for a 6-week postpartum checkup.    She had a  of a liveborn baby boy, weight 8 pounds 11 oz.  The delivery was uncomplicated.  Since delivery, she has been breast feeding.  She has not had a normal menses.  She has not had intercourse.  Patient screened for postpartum depression and complaints are NEGATIVE. Screening has also been completed for intimate partner violence..    Her last pap was  and was Normal    EXAM: /82  Pulse 62  Temp 96.7  F (35.9  C) (Oral)  Wt 168 lb 3.2 oz (76.3 kg)  LMP 2017  SpO2 99%  Breastfeeding? Yes  BMI 27.56 kg/m2    HEENT: grossly normal.  NECK: no lymphadenopathy or thyromegaly.  ABDOMEN: soft, non tender, good bowel sounds, without masses, rebound, guarding or tenderness.  INC: well healed   EXTREMITIES: Warm to touch, no ankle edema or calf tenderness.    PELVIC:    External genitalia: normal without lesion,  perineum well healed   Vagina: normal mucosa and rugae, normal discharge.  Cervix: normal without lesion.  Uterus: small, mobile, nontender.  Adnexa: No masses, No tenderness  Rectal: deferred, external hemorrhoids absent    A/P  Routine Postpartum    ICD-10-CM    1. Routine postpartum follow-up Z39.2        1. Contraception: condom  2. Annual due in  every 12 months    CEPHAS AGBEH, MD.

## 2018-02-01 NOTE — MR AVS SNAPSHOT
After Visit Summary   2/1/2018    Roselyn Butler    MRN: [id number]           Patient Information     Date Of Birth          1985        Visit Information        Provider Department      2/1/2018 9:30 AM Agbeh, Cephas Mawuena, MD [facility]        Care Instructions                                                           If you have any questions regarding your visit, Please contact your care team.    [facility] CLINIC HOURS TELEPHONE NUMBER   Cephas Agbeh, M.D. Kristen - JOHN Maradiaga- FENG Araujo - RN    Jayda -         Monday-[facility]  8:00 am - 5 pm  Tuesday- [facility]  8:00am- 5 pm  Wednesday- Off  Thursday- [facility]  8:00 am- 5 pm  Friday-[place]  8:00 am 5 pm [facility]  [address] [place] [address] [phone] ask for [facility]    Imaging [phone]    [facility]  [address] [place] [address]  [place] [address] [phone]  Imaging [phone]     Urgent Care locations:    [facility] Saturday and Sunday   9 am - 5 pm    Monday-Friday   12 pm - 8 pm  Saturday and Sunday   9 am - 5 pm   [phone]       If you need a medication refill, please contact your pharmacy. Please allow 3 business days for your refill to be completed.  As always, Thank you for trusting us with your healthcare needs!                  Follow-ups after your visit        Your next 10 appointments already scheduled     Feb 23, 2018  8:30 AM CST   Return Visit with Nusrat Wilson MD   [facility]    [address] [facility] [address] [phone]              Who to contact     If you have questions or need follow up information about today's clinic visit or your schedule please contact [facility] [place] directly at [phone].  Normal or non-critical lab and imaging results will be communicated  to you by Closet Couturehart, letter or phone within 4 business days after the clinic has received the results. If you do not hear from us within 7 days, please contact the clinic through Ushi or phone. If you have a critical or abnormal lab result, we will notify you by phone as soon as possible.  Submit refill requests through Ushi or call your pharmacy and they will forward the refill request to us. Please allow 3 business days for your refill to be completed.          Additional Information About Your Visit        Ushi Information     Ushi gives you secure access to your electronic health record. If you see a primary care provider, you can also send messages to your care team and make appointments. If you have questions, please call your primary care clinic.  If you do not have a primary care provider, please call [phone] and they will assist you.        Care EveryWhere ID     This is your Care EveryWhere ID. This could be used by other organizations to access your [place] medical records  [id number]        Your Vitals Were     Pulse Temperature Last Period Pulse Oximetry Breastfeeding? BMI (Body Mass Index)    62 96.7  F (35.9  C) (Oral) 03/12/2017 99% Yes 27.56 kg/m2       Blood Pressure from Last 3 Encounters:   02/01/18 129/82   12/08/17 133/86   12/05/17 114/75    Weight from Last 3 Encounters:   02/01/18 168 lb 3.2 oz (76.3 kg)   12/08/17 183 lb 9.6 oz (83.3 kg)   12/05/17 181 lb 14.4 oz (82.5 kg)              Today, you had the following     No orders found for display         Today's Medication Changes          These changes are accurate as of 2/1/18  9:42 AM.  If you have any questions, ask your nurse or doctor.               Stop taking these medicines if you haven't already. Please contact your care team if you have questions.     azithromycin 250 MG tablet   Commonly known as:  ZITHROMAX   Stopped by:  Agbeh, Cephas Mawuena, MD           IRON SUPPLEMENT PO   Stopped by:  Agbeh, Cephas  MD [place]                    Primary Care Provider Office Phone # Fax #    Amber Mawuena Agbeh, [phone]       [address]  [place] [address]        Equal Access to Services     [facility]: Hadii aad ku hadasho Soomaali, waaxda luqadaha, qaybta kaalmada adeegyada, waxay idiin hayaan adeeg khlinda lareagann ah. So [facility] [phone].    ATENCIÓN: Si habla español, tiene a barrientos disposición servicios gratuitos de asistencia lingüística. Llame al [phone].    We comply with applicable federal civil rights laws and [place] laws. We do not discriminate on the basis of race, color, national origin, age, disability, sex, sexual orientation, or gender identity.            Thank you!     Thank you for choosing [facility]  for your care. Our goal is always to provide you with excellent care. Hearing back from our patients is one way we can continue to improve our services. Please take a few minutes to complete the written survey that you may receive in the mail after your visit with us. Thank you!             Your Updated Medication List - Protect others around you: Learn how to safely use, store and throw away your medicines at www.disposemymeds.org.          This list is accurate as of 2/1/18  9:42 AM.  Always use your most recent med list.                   Brand Name Dispense Instructions for use Diagnosis    prenatal multivitamin plus iron 27-0.8 MG Tabs per tablet      Take 1 tablet by mouth daily    Pregnancy examination or test, pregnancy unconfirmed, Recurrent pregnancy loss (CODE)

## 2018-02-01 NOTE — PATIENT INSTRUCTIONS
If you have any questions regarding your visit, Please contact your care team.    [facility] CLINIC HOURS TELEPHONE NUMBER   Ambers Agbeh, M.D.    Ann Maradiaga- FENG Araujo - FENG Montelongo -         Monday-[facility]  8:00 am - 5 pm  Tuesday- [facility]  8:00am- 5 pm  Wednesday- Off  Thursday- [facility]  8:00 am- 5 pm  Friday-[place]  8:00 am 5 pm [facility]  [address] [place] [address]  [phone] ask for [facility]    Imaging [phone]    [facility]  [address]  [place] [address] [phone]  Imaging [phone]     Urgent Care locations:    [facility] Saturday and Sunday   9 am - 5 pm    Monday-Friday   12 pm - 8 pm  Saturday and Sunday   9 am - 5 pm   [phone]       If you need a medication refill, please contact your pharmacy. Please allow 3 business days for your refill to be completed.  As always, Thank you for trusting us with your healthcare needs!

## 2018-02-01 NOTE — NURSING NOTE
"Chief Complaint   Patient presents with     Post Partum Exam     DOD       Initial /82  Pulse 62  Temp 96.7  F (35.9  C) (Oral)  Wt 168 lb 3.2 oz (76.3 kg)  LMP 03/12/2017  SpO2 99%  Breastfeeding? Yes  BMI 27.56 kg/m2 Estimated body mass index is 27.56 kg/(m^2) as calculated from the following:    Height as of 10/13/17: 5' 5.5\" (1.664 m).    Weight as of this encounter: 168 lb 3.2 oz (76.3 kg).  Medication Reconciliation: complete   Cherrie Lipscomb CMA      "

## 2018-02-02 ASSESSMENT — PATIENT HEALTH QUESTIONNAIRE - PHQ9: SUM OF ALL RESPONSES TO PHQ QUESTIONS 1-9: 0

## 2018-02-23 ENCOUNTER — OFFICE VISIT (OUTPATIENT)
Dept: DERMATOLOGY | Facility: CLINIC | Age: 33
End: 2018-02-23
Payer: COMMERCIAL

## 2018-02-23 DIAGNOSIS — L57.0 ACTINIC KERATOSIS: ICD-10-CM

## 2018-02-23 DIAGNOSIS — B07.9 VIRAL WARTS, UNSPECIFIED TYPE: ICD-10-CM

## 2018-02-23 DIAGNOSIS — D22.9 MULTIPLE BENIGN NEVI: ICD-10-CM

## 2018-02-23 DIAGNOSIS — Z86.018 HISTORY OF DYSPLASTIC NEVUS: Primary | ICD-10-CM

## 2018-02-23 PROCEDURE — 17000 DESTRUCT PREMALG LESION: CPT | Mod: 59 | Performed by: DERMATOLOGY

## 2018-02-23 PROCEDURE — 99213 OFFICE O/P EST LOW 20 MIN: CPT | Mod: 25 | Performed by: DERMATOLOGY

## 2018-02-23 PROCEDURE — 17110 DESTRUCTION B9 LES UP TO 14: CPT | Performed by: DERMATOLOGY

## 2018-02-23 NOTE — PATIENT INSTRUCTIONS
CRYOTHERAPY    What is it?    Use of a very cold liquid, such as liquid nitrogen, to freeze and destroy abnormal skin cells that need to be removed    What should I expect?    Tenderness and redness    A small blister that might grow and fill with dark purple blood. There may be crusting.    More than one treatment may be needed if the lesions do not go away.    How do I care for the treated area?    Gently wash the area with your hands when bathing.    Use a thin layer of Vaselin to help with healing. You may use a Band-Aid.     The area should heal within 7-10 days.    Do not use an antibiotic or Neosporin ointment.     You may take acetaminophen (Tylenol) for pain.     Call your Doctor if you have:    Severe pain    Signs of infection (warmth, redness, cloudy yellow drainage, and or a bad smell)    Questions or concerns    Contact infromation:  [facility] [phone]  [facility] [phone]

## 2018-02-23 NOTE — LETTER
2/23/2018         RE: Roselyn Butler  [address]  [place] [address]        Dear Colleague,    Thank you for referring your patient, Roselyn Butler, to the [facility]. Please see a copy of my visit note below.    [facility] Dermatology Note    Dermatology Problem List:  1. Compound nevus with moderate dysplasia, left lower back  -s/p biopsy 10/17/2016, excision 3/30/2017   2. Comedones, nose  -Previous Tx: Differin (initiated 10/17/2016)    Encounter Date: Feb 23, 2018    CC:  Chief Complaint   Patient presents with     RECHECK     1 year skin check          History of Present Illness:  Ms. Roselyn Butler is a 32 year old female who presents as a follow up for history of dysplastic nevus. The patient was last seen 3/30/2017 when the patient was treated with excision for compound nevus with moderate dysplasia on the left lower back. She reports today warts on the L hand. Holding differin.  She notes skin tags that get caught by her razor but has none today. The patient reports no other lesions of concern.      Past Medical History:   Patient Active Problem List   Diagnosis     Encounter for supervision of other normal pregnancy     Need for Tdap vaccination     Past Medical History:   Diagnosis Date     NO ACTIVE PROBLEMS      Past Surgical History:   Procedure Laterality Date     DENTAL SURGERY      wisdom teeth      Social History:  The patient works as a .     Family History:  No family history of melanoma    Medications:  Current Outpatient Prescriptions   Medication Sig Dispense Refill     Prenatal Vit-Fe Fumarate-FA (PRENATAL MULTIVITAMIN  PLUS IRON) 27-0.8 MG TABS per tablet Take 1 tablet by mouth daily       No Known Allergies    Review of Systems:  -Skin: As above in HPI. No additional skin concerns.  -Const: The patient is generally feeling well today.     Physical exam:  Vitals: LMP 03/12/2017  GEN: This is a well  developed, well-nourished female in no acute distress, in a pleasant mood.    SKIN: Total skin excluding the undergarment areas was performed. The exam included the head/face, neck, both arms, chest, back, abdomen, both legs, digits and/or nails. Including buttocks, declines genital exam.   -There is a well healed surgical scar without erythema, nodularity or telangiectasias on the left lower back.   - There are erythematous macules with overyling adherent scale on the right temple.   - There are verrucous papules with thrombosed capillaries interrupting dermatoglyphics on the L hand.   - There is(are) pigmented and skin colored   papules  regular on the trunk and extremities.   -No other lesions of concern on areas examined.       Impression/Plan:  1. History of dysplastic nevus    Last total skin exam 10/17/2016    Recommend sunscreens SPF #30 or greater, protective clothing and avoidance of tanning beds.    ABCD's of melanoma were reviewed with patient and handout provided.     2. AK, right temple  Cryotherapy procedure note: After verbal consent and discussion of risks and benefits including but no limited to dyspigmentation/scar, blister, and pain, 1 was(were) treated with 1-2mm freeze border for 2 cycles with liquid nitrogen. Post cryotherapy instructions were provided.   3. Pink macule, consistent with scar, right temple-stable    No further intervention required at this time.   4. Acne vulgaris-none today    Hold Differin 0.1% cream   5. Skin tag, axilla     None seen today  6. Verruca vulgaris, left hand hx of pruritus   Cryotherapy procedure note: After verbal consent and discussion of risks and benefits including but no limited to dyspigmentation/scar, blister, and pain, 3 was(were) treated with 1-2mm freeze border for 2 cycles with liquid nitrogen. Post cryotherapy instructions were provided.     Follow up in 1 year, earlier for new or changing lesions.     Staff Involved:  Scribe/Staff    Scribe  Disclosure:   I, Jamaica Wilcoxryan, am serving as a scribe to document services personally performed by Dr. Nusrat Wilson, based on data collection and the provider's statements to me.     Provider Disclosure:   The documentation recorded by the scribe accurately reflects the services I personally performed and the decisions made by me.    Nusrat Wilson MD    Department of Dermatology  [facility]: Phone: [phone], Fax:[phone]  [facility]: Phone: [phone], Fax: [phone]        Again, thank you for allowing me to participate in the care of your patient.        Sincerely,        Nusrat Wilson MD

## 2018-02-23 NOTE — NURSING NOTE
"Roselyn Butler's goals for this visit include:   Chief Complaint   Patient presents with     RECHECK     1 year skin check        She requests these members of her care team be copied on today's visit information: yes    PCP: Agbeh, Cephas Mawuena    Referring Provider:  No referring provider defined for this encounter.    Chief Complaint   Patient presents with     RECHECK     1 year skin check        Initial LMP 03/12/2017 Estimated body mass index is 27.56 kg/(m^2) as calculated from the following:    Height as of 10/13/17: 1.664 m (5' 5.5\").    Weight as of 2/1/18: 76.3 kg (168 lb 3.2 oz).  Medication Reconciliation: complete    Do you need any medication refills at today's visit? No     Amorrae AMAN Cabral        "

## 2018-02-23 NOTE — MR AVS SNAPSHOT
After Visit Summary   2/23/2018    Roselyn Butler    MRN: [id number]           Patient Information     Date Of Birth          1985        Visit Information        Provider Department      2/23/2018 8:30 AM Nusrat Wilson MD [facility]        Today's Diagnoses     History of dysplastic nevus    -  1    Multiple benign nevi        Viral warts, unspecified type        Actinic keratosis          Care Instructions    CRYOTHERAPY    What is it?    Use of a very cold liquid, such as liquid nitrogen, to freeze and destroy abnormal skin cells that need to be removed    What should I expect?    Tenderness and redness    A small blister that might grow and fill with dark purple blood. There may be crusting.    More than one treatment may be needed if the lesions do not go away.    How do I care for the treated area?    Gently wash the area with your hands when bathing.    Use a thin layer of Vaselin to help with healing. You may use a Band-Aid.     The area should heal within 7-10 days.    Do not use an antibiotic or Neosporin ointment.     You may take acetaminophen (Tylenol) for pain.     Call your Doctor if you have:    Severe pain    Signs of infection (warmth, redness, cloudy yellow drainage, and or a bad smell)    Questions or concerns    Contact infromation:  [facility] [phone]  [facility] [phone]          Follow-ups after your visit        Your next 10 appointments already scheduled     Feb 12, 2019  8:45 AM CST   Return Visit with Nusrat Wilson MD   [facility]    [address] [place] [address] [phone]              Who to contact     If you have questions or need follow up information about today's clinic visit or your schedule please contact [facility] directly at [phone].  Normal or non-critical lab and  imaging results will be communicated to you by MyChart, letter or phone within 4 business days after the clinic has received the results. If you do not hear from us within 7 days, please contact the clinic through Consultedt or phone. If you have a critical or abnormal lab result, we will notify you by phone as soon as possible.  Submit refill requests through [employer] or call your pharmacy and they will forward the refill request to us. Please allow 3 business days for your refill to be completed.          Additional Information About Your Visit        [employer] Information     [employer] gives you secure access to your electronic health record. If you see a primary care provider, you can also send messages to your care team and make appointments. If you have questions, please call your primary care clinic.  If you do not have a primary care provider, please call [phone] and they will assist you.      [employer] is an electronic gateway that provides easy, online access to your medical records. With [employer], you can request a clinic appointment, read your test results, renew a prescription or communicate with your care team.     To access your existing account, please contact your [facility] or call [phone] for assistance.        Care EveryWhere ID     This is your Care EveryWhere ID. This could be used by other organizations to access your [place] medical records  [id number]        Your Vitals Were     Last Period                   03/12/2017            Blood Pressure from Last 3 Encounters:   02/01/18 129/82   12/08/17 133/86   12/05/17 114/75    Weight from Last 3 Encounters:   02/01/18 168 lb 3.2 oz (76.3 kg)   12/08/17 183 lb 9.6 oz (83.3 kg)   12/05/17 181 lb 14.4 oz (82.5 kg)              We Performed the Following     DESTRUCT PREMALIGNANT LESION, FIRST        Primary Care Provider Office Phone # Fax #    Amber Mawuena Agbeh, [phone] [address]  [place] [address]        Equal Access to Services     GAGEJOHAN GUERRARACHANA : Hadii yomi hancock aishwarya Soyoniali, waaxda luqadaha, qaybta kaalmada aureliajeffreyarmando, waxay idiin hayrustycarri jaytimothynathaly burdick. So [place] [phone].    ATENCIÓN: Si habla español, tiene a barrientos disposición servicios gratuitos de asistencia lingüística. Llame al [phone].    We comply with applicable federal civil rights laws and [place] laws. We do not discriminate on the basis of race, color, national origin, age, disability, sex, sexual orientation, or gender identity.            Thank you!     Thank you for choosing [facility]  for your care. Our goal is always to provide you with excellent care. Hearing back from our patients is one way we can continue to improve our services. Please take a few minutes to complete the written survey that you may receive in the mail after your visit with us. Thank you!             Your Updated Medication List - Protect others around you: Learn how to safely use, store and throw away your medicines at www.disposemymeds.org.          This list is accurate as of 2/23/18  9:30 AM.  Always use your most recent med list.                   Brand Name Dispense Instructions for use Diagnosis    prenatal multivitamin plus iron 27-0.8 MG Tabs per tablet      Take 1 tablet by mouth daily    Pregnancy examination or test, pregnancy unconfirmed, Recurrent pregnancy loss (CODE)

## 2018-02-23 NOTE — PROGRESS NOTES
[facility] Dermatology Note    Dermatology Problem List:  1. Compound nevus with moderate dysplasia, left lower back  -s/p biopsy 10/17/2016, excision 3/30/2017   2. Comedones, nose  -Previous Tx: Differin (initiated 10/17/2016)    Encounter Date: Feb 23, 2018    CC:  Chief Complaint   Patient presents with     RECHECK     1 year skin check          History of Present Illness:  Ms. Roselyn Butler is a 32 year old female who presents as a follow up for history of dysplastic nevus. The patient was last seen 3/30/2017 when the patient was treated with excision for compound nevus with moderate dysplasia on the left lower back. She reports today warts on the L hand. Holding differin.  She notes skin tags that get caught by her razor but has none today. The patient reports no other lesions of concern.      Past Medical History:   Patient Active Problem List   Diagnosis     Encounter for supervision of other normal pregnancy     Need for Tdap vaccination     Past Medical History:   Diagnosis Date     NO ACTIVE PROBLEMS      Past Surgical History:   Procedure Laterality Date     DENTAL SURGERY      wisdom teeth      Social History:  The patient works as a .     Family History:  No family history of melanoma    Medications:  Current Outpatient Prescriptions   Medication Sig Dispense Refill     Prenatal Vit-Fe Fumarate-FA (PRENATAL MULTIVITAMIN  PLUS IRON) 27-0.8 MG TABS per tablet Take 1 tablet by mouth daily       No Known Allergies    Review of Systems:  -Skin: As above in HPI. No additional skin concerns.  -Const: The patient is generally feeling well today.     Physical exam:  Vitals: LMP 03/12/2017  GEN: This is a well developed, well-nourished female in no acute distress, in a pleasant mood.    SKIN: Total skin excluding the undergarment areas was performed. The exam included the head/face, neck, both arms, chest, back, abdomen, both legs, digits and/or nails. Including  buttocks, declines genital exam.   -There is a well healed surgical scar without erythema, nodularity or telangiectasias on the left lower back.   - There are erythematous macules with overyling adherent scale on the right temple.   - There are verrucous papules with thrombosed capillaries interrupting dermatoglyphics on the L hand.   - There is(are) pigmented and skin colored   papules  regular on the trunk and extremities.   -No other lesions of concern on areas examined.       Impression/Plan:  1. History of dysplastic nevus    Last total skin exam 10/17/2016    Recommend sunscreens SPF #30 or greater, protective clothing and avoidance of tanning beds.    ABCD's of melanoma were reviewed with patient and handout provided.     2. AK, right temple  Cryotherapy procedure note: After verbal consent and discussion of risks and benefits including but no limited to dyspigmentation/scar, blister, and pain, 1 was(were) treated with 1-2mm freeze border for 2 cycles with liquid nitrogen. Post cryotherapy instructions were provided.   3. Pink macule, consistent with scar, right temple-stable    No further intervention required at this time.   4. Acne vulgaris-none today    Hold Differin 0.1% cream   5. Skin tag, axilla     None seen today  6. Verruca vulgaris, left hand hx of pruritus   Cryotherapy procedure note: After verbal consent and discussion of risks and benefits including but no limited to dyspigmentation/scar, blister, and pain, 3 was(were) treated with 1-2mm freeze border for 2 cycles with liquid nitrogen. Post cryotherapy instructions were provided.     Follow up in 1 year, earlier for new or changing lesions.     Staff Involved:  Scribe/Staff    Scribe Disclosure:   Jamaica MALONE, am serving as a scribe to document services personally performed by Dr. Nusrat Wilson, based on data collection and the provider's statements to me.     Provider Disclosure:   The documentation recorded by the scribe accurately  reflects the services I personally performed and the decisions made by me.    Nusrat Wilson MD    Department of Dermatology  [facility]: Phone: [phone], Fax:[phone]  [facility]: Phone: [phone], Fax: [phone]

## 2018-11-29 ENCOUNTER — ALLIED HEALTH/NURSE VISIT (OUTPATIENT)
Dept: NURSING | Facility: CLINIC | Age: 33
End: 2018-11-29
Payer: COMMERCIAL

## 2018-11-29 DIAGNOSIS — Z23 NEED FOR PROPHYLACTIC VACCINATION AND INOCULATION AGAINST INFLUENZA: Primary | ICD-10-CM

## 2018-11-29 PROCEDURE — 90686 IIV4 VACC NO PRSV 0.5 ML IM: CPT

## 2018-11-29 PROCEDURE — 90471 IMMUNIZATION ADMIN: CPT

## 2018-11-29 PROCEDURE — 99207 ZZC NO CHARGE NURSE ONLY: CPT

## 2018-11-29 NOTE — PROGRESS NOTES

## 2018-11-29 NOTE — MR AVS SNAPSHOT
After Visit Summary   11/29/2018    Roselyn Butler    MRN: [id number]           Patient Information     Date Of Birth          1985        Visit Information        Provider Department      11/29/2018 7:15 AM BE ANCILLARY [facility]        Today's Diagnoses     Need for prophylactic vaccination and inoculation against influenza    -  1       Follow-ups after your visit        Your next 10 appointments already scheduled     Feb 12, 2019  8:45 AM CST   Return Visit with Nusrat Wilson MD   [facility]    [address] [place] [id number] [phone]              Who to contact     If you have questions or need follow up information about today's clinic visit or your schedule please contact [facility] directly at [phone].  Normal or non-critical lab and imaging results will be communicated to you by MyChart, letter or phone within 4 business days after the clinic has received the results. If you do not hear from us within 7 days, please contact the clinic through MyChart or phone. If you have a critical or abnormal lab result, we will notify you by phone as soon as possible.  Submit refill requests through [employer] or call your pharmacy and they will forward the refill request to us. Please allow 3 business days for your refill to be completed.          Additional Information About Your Visit        MyChart Information     [employer] gives you secure access to your electronic health record. If you see a primary care provider, you can also send messages to your care team and make appointments. If you have questions, please call your primary care clinic.  If you do not have a primary care provider, please call [phone] and they will assist you.        Care EveryWhere ID     This is your Care EveryWhere ID. This could be used by other organizations to access your [place] medical records  [id number]          Blood Pressure from Last 3 Encounters:   02/01/18 129/82   12/08/17 133/86   12/05/17 114/75    Weight from Last 3 Encounters:   02/01/18 168 lb 3.2 oz (76.3 kg)   12/08/17 183 lb 9.6 oz (83.3 kg)   12/05/17 181 lb 14.4 oz (82.5 kg)              We Performed the Following     FLU VACCINE, SPLIT VIRUS, IM (QUADRIVALENT) [74487]- >3 YRS     Vaccine Administration, Initial [49992]        Primary Care Provider Office Phone # Fax #    Amber Mawuena Agbeh, [phone] [address] [place] [address]        Equal Access to Services     JOHAN SETHI : Hadii yomi hancock hadasho Soseverino, waaxda luqadaha, qaybta kaalmada adeegyada, vaughn seymour . So [facility] [phone].    ATENCIÓN: Si habla español, tiene a barrientos disposición servicios gratuitos de asistencia lingüística. Llame al [phone].    We comply with applicable federal civil rights laws and [place] laws. We do not discriminate on the basis of race, color, national origin, age, disability, sex, sexual orientation, or gender identity.            Thank you!     Thank you for choosing [facility]  for your care. Our goal is always to provide you with excellent care. Hearing back from our patients is one way we can continue to improve our services. Please take a few minutes to complete the written survey that you may receive in the mail after your visit with us. Thank you!             Your Updated Medication List - Protect others around you: Learn how to safely use, store and throw away your medicines at www.disposemymeds.org.          This list is accurate as of 11/29/18  7:58 AM.  Always use your most recent med list.                   Brand Name Dispense Instructions for use Diagnosis    prenatal multivitamin w/iron 27-0.8 MG tablet      Take 1 tablet by mouth daily    Pregnancy examination or test, pregnancy unconfirmed, Recurrent pregnancy loss (CODE)

## 2019-12-30 ENCOUNTER — IMMUNIZATION (OUTPATIENT)
Dept: NURSING | Facility: CLINIC | Age: 34
End: 2019-12-30
Payer: COMMERCIAL

## 2019-12-30 VITALS — TEMPERATURE: 98.4 F

## 2019-12-30 DIAGNOSIS — Z23 NEED FOR PROPHYLACTIC VACCINATION AND INOCULATION AGAINST INFLUENZA: Primary | ICD-10-CM

## 2019-12-30 PROCEDURE — 90471 IMMUNIZATION ADMIN: CPT

## 2019-12-30 PROCEDURE — 99207 ZZC NO CHARGE NURSE ONLY: CPT

## 2019-12-30 PROCEDURE — 90686 IIV4 VACC NO PRSV 0.5 ML IM: CPT

## 2019-12-30 NOTE — PROGRESS NOTES
Panel Management Review    Summary:    Patient is due/failing the following:   PHQ 2 and PHYSICAL    Health Maintenance Due   Topic Date Due     HPV  03/27/2006     PHQ-2  01/01/2019     PREVENTIVE CARE VISIT  02/01/2019     PAP  07/08/2019     INFLUENZA VACCINE (1) 09/01/2019        Type of outreach:    Verbal. Spoke to patient at ancillary appointment.        Gave PHQ 2 for patient to complete.  Patient will call to make appointment for physical.                                                                                                                              Minna Avila CMA      Chart routed to Care Team .

## 2020-03-02 ENCOUNTER — HEALTH MAINTENANCE LETTER (OUTPATIENT)
Age: 35
End: 2020-03-02

## 2020-12-14 ENCOUNTER — HEALTH MAINTENANCE LETTER (OUTPATIENT)
Age: 35
End: 2020-12-14

## 2021-04-18 ENCOUNTER — HEALTH MAINTENANCE LETTER (OUTPATIENT)
Age: 36
End: 2021-04-18

## 2021-10-02 ENCOUNTER — HEALTH MAINTENANCE LETTER (OUTPATIENT)
Age: 36
End: 2021-10-02

## 2022-05-14 ENCOUNTER — HEALTH MAINTENANCE LETTER (OUTPATIENT)
Age: 37
End: 2022-05-14

## 2022-09-03 ENCOUNTER — HEALTH MAINTENANCE LETTER (OUTPATIENT)
Age: 37
End: 2022-09-03

## 2023-06-03 ENCOUNTER — HEALTH MAINTENANCE LETTER (OUTPATIENT)
Age: 38
End: 2023-06-03

## 2024-11-23 ENCOUNTER — HEALTH MAINTENANCE LETTER (OUTPATIENT)
Age: 39
End: 2024-11-23

## 2025-03-30 ENCOUNTER — HEALTH MAINTENANCE LETTER (OUTPATIENT)
Age: 40
End: 2025-03-30